# Patient Record
Sex: FEMALE | Race: WHITE | NOT HISPANIC OR LATINO
[De-identification: names, ages, dates, MRNs, and addresses within clinical notes are randomized per-mention and may not be internally consistent; named-entity substitution may affect disease eponyms.]

---

## 2017-10-27 ENCOUNTER — LABORATORY RESULT (OUTPATIENT)
Age: 52
End: 2017-10-27

## 2017-10-27 ENCOUNTER — APPOINTMENT (OUTPATIENT)
Dept: PULMONOLOGY | Facility: CLINIC | Age: 52
End: 2017-10-27
Payer: COMMERCIAL

## 2017-10-27 PROCEDURE — 99203 OFFICE O/P NEW LOW 30 MIN: CPT | Mod: 25

## 2017-11-03 ENCOUNTER — APPOINTMENT (OUTPATIENT)
Dept: PULMONOLOGY | Facility: CLINIC | Age: 52
End: 2017-11-03

## 2017-11-03 ENCOUNTER — APPOINTMENT (OUTPATIENT)
Dept: PULMONOLOGY | Facility: CLINIC | Age: 52
End: 2017-11-03
Payer: COMMERCIAL

## 2017-11-03 ENCOUNTER — LABORATORY RESULT (OUTPATIENT)
Age: 52
End: 2017-11-03

## 2017-11-03 PROCEDURE — 36415 COLL VENOUS BLD VENIPUNCTURE: CPT

## 2017-11-03 PROCEDURE — 99214 OFFICE O/P EST MOD 30 MIN: CPT | Mod: 25

## 2017-11-13 ENCOUNTER — APPOINTMENT (OUTPATIENT)
Dept: PULMONOLOGY | Facility: CLINIC | Age: 52
End: 2017-11-13

## 2018-01-31 ENCOUNTER — APPOINTMENT (OUTPATIENT)
Dept: OPHTHALMOLOGY | Facility: CLINIC | Age: 53
End: 2018-01-31
Payer: COMMERCIAL

## 2018-01-31 PROCEDURE — 92014 COMPRE OPH EXAM EST PT 1/>: CPT

## 2018-02-13 ENCOUNTER — APPOINTMENT (OUTPATIENT)
Dept: PULMONOLOGY | Facility: CLINIC | Age: 53
End: 2018-02-13
Payer: COMMERCIAL

## 2018-02-13 PROCEDURE — 94727 GAS DIL/WSHOT DETER LNG VOL: CPT

## 2018-02-13 PROCEDURE — 94060 EVALUATION OF WHEEZING: CPT

## 2018-02-13 PROCEDURE — 94729 DIFFUSING CAPACITY: CPT

## 2018-05-01 ENCOUNTER — APPOINTMENT (OUTPATIENT)
Dept: PULMONOLOGY | Facility: CLINIC | Age: 53
End: 2018-05-01
Payer: COMMERCIAL

## 2018-05-01 PROCEDURE — 99214 OFFICE O/P EST MOD 30 MIN: CPT | Mod: 25

## 2018-05-23 ENCOUNTER — APPOINTMENT (OUTPATIENT)
Dept: OTOLARYNGOLOGY | Facility: CLINIC | Age: 53
End: 2018-05-23
Payer: COMMERCIAL

## 2018-05-23 VITALS
DIASTOLIC BLOOD PRESSURE: 46 MMHG | SYSTOLIC BLOOD PRESSURE: 101 MMHG | BODY MASS INDEX: 35 KG/M2 | HEART RATE: 76 BPM | WEIGHT: 250 LBS | HEIGHT: 71 IN

## 2018-05-23 DIAGNOSIS — M81.0 AGE-RELATED OSTEOPOROSIS W/OUT CURRENT PATHOLOGICAL FRACTURE: ICD-10-CM

## 2018-05-23 DIAGNOSIS — S32.009A UNSPECIFIED FRACTURE OF UNSPECIFIED LUMBAR VERTEBRA, INITIAL ENCOUNTER FOR CLOSED FRACTURE: ICD-10-CM

## 2018-05-23 DIAGNOSIS — K75.4 AUTOIMMUNE HEPATITIS: ICD-10-CM

## 2018-05-23 DIAGNOSIS — Z86.39 PERSONAL HISTORY OF OTHER ENDOCRINE, NUTRITIONAL AND METABOLIC DISEASE: ICD-10-CM

## 2018-05-23 DIAGNOSIS — D69.6 THROMBOCYTOPENIA, UNSPECIFIED: ICD-10-CM

## 2018-05-23 DIAGNOSIS — Z87.891 PERSONAL HISTORY OF NICOTINE DEPENDENCE: ICD-10-CM

## 2018-05-23 DIAGNOSIS — D49.2 NEOPLASM OF UNSPECIFIED BEHAVIOR OF BONE, SOFT TISSUE, AND SKIN: ICD-10-CM

## 2018-05-23 DIAGNOSIS — Z86.79 PERSONAL HISTORY OF OTHER DISEASES OF THE CIRCULATORY SYSTEM: ICD-10-CM

## 2018-05-23 DIAGNOSIS — Z82.49 FAMILY HISTORY OF ISCHEMIC HEART DISEASE AND OTHER DISEASES OF THE CIRCULATORY SYSTEM: ICD-10-CM

## 2018-05-23 PROCEDURE — 99204 OFFICE O/P NEW MOD 45 MIN: CPT | Mod: 25

## 2018-05-23 PROCEDURE — 31231 NASAL ENDOSCOPY DX: CPT

## 2018-05-29 PROBLEM — S32.009A LUMBAR VERTEBRAL FRACTURE: Status: RESOLVED | Noted: 2018-05-29 | Resolved: 2018-05-29

## 2018-05-29 PROBLEM — D69.6 THROMBOCYTOPENIA: Status: ACTIVE | Noted: 2018-05-29

## 2018-05-29 PROBLEM — D49.2 LUMBAR SPINE TUMOR: Status: ACTIVE | Noted: 2018-05-29

## 2018-05-29 PROBLEM — M81.0 OSTEOPOROSIS: Status: ACTIVE | Noted: 2018-05-29

## 2018-05-29 PROBLEM — K75.4 AUTOIMMUNE HEPATITIS: Status: ACTIVE | Noted: 2018-05-29

## 2018-06-01 PROBLEM — Z82.49 FAMILY HISTORY OF HYPERTENSION: Status: ACTIVE | Noted: 2018-06-01

## 2018-06-01 RX ORDER — RIFAXIMIN 550 MG/1
550 TABLET ORAL TWICE DAILY
Refills: 0 | Status: ACTIVE | COMMUNITY

## 2018-06-01 RX ORDER — URSODIOL 300 MG/1
300 CAPSULE ORAL TWICE DAILY
Refills: 0 | Status: ACTIVE | COMMUNITY

## 2018-06-01 RX ORDER — LISINOPRIL 5 MG/1
5 TABLET ORAL
Refills: 0 | Status: ACTIVE | COMMUNITY

## 2018-06-01 RX ORDER — SODIUM SULFATE, POTASSIUM SULFATE, MAGNESIUM SULFATE 17.5; 3.13; 1.6 G/ML; G/ML; G/ML
17.5-3.13-1.6 SOLUTION, CONCENTRATE ORAL
Qty: 354 | Refills: 0 | Status: COMPLETED | COMMUNITY
Start: 2018-05-16

## 2018-06-01 RX ORDER — GABAPENTIN 300 MG/1
300 CAPSULE ORAL 3 TIMES DAILY
Refills: 0 | Status: ACTIVE | COMMUNITY

## 2018-06-01 RX ORDER — FUROSEMIDE 80 MG/1
80 TABLET ORAL TWICE DAILY
Refills: 0 | Status: ACTIVE | COMMUNITY

## 2018-06-01 RX ORDER — HYDROCODONE BITARTRATE AND IBUPROFEN 10; 200 MG/1; MG/1
10-200 TABLET ORAL
Qty: 30 | Refills: 0 | Status: COMPLETED | COMMUNITY
Start: 2017-12-06

## 2018-06-01 RX ORDER — MAGNESIUM OXIDE 200 MG
10 TABLET,CHEWABLE ORAL
Refills: 0 | Status: ACTIVE | COMMUNITY

## 2018-06-01 RX ORDER — DEXLANSOPRAZOLE 60 MG/1
60 CAPSULE, DELAYED RELEASE ORAL
Qty: 90 | Refills: 0 | Status: COMPLETED | COMMUNITY
Start: 2017-06-20

## 2018-06-01 RX ORDER — FLUTICASONE PROPIONATE 50 UG/1
50 SPRAY, METERED NASAL
Qty: 48 | Refills: 0 | Status: COMPLETED | COMMUNITY
Start: 2017-12-05

## 2018-06-01 RX ORDER — IPRATROPIUM BROMIDE 42 UG/1
0.06 SPRAY NASAL
Qty: 15 | Refills: 0 | Status: ACTIVE | COMMUNITY
Start: 2018-05-02

## 2018-06-01 RX ORDER — VALACYCLOVIR 1 G/1
1 TABLET, FILM COATED ORAL
Qty: 10 | Refills: 0 | Status: COMPLETED | COMMUNITY
Start: 2018-03-01

## 2018-06-01 RX ORDER — ALBUTEROL SULFATE 90 UG/1
108 (90 BASE) AEROSOL, METERED RESPIRATORY (INHALATION)
Qty: 54 | Refills: 0 | Status: ACTIVE | COMMUNITY
Start: 2018-02-20

## 2018-06-13 ENCOUNTER — APPOINTMENT (OUTPATIENT)
Dept: OTOLARYNGOLOGY | Facility: CLINIC | Age: 53
End: 2018-06-13
Payer: COMMERCIAL

## 2018-06-17 ENCOUNTER — FORM ENCOUNTER (OUTPATIENT)
Age: 53
End: 2018-06-17

## 2018-06-18 ENCOUNTER — OUTPATIENT (OUTPATIENT)
Dept: OUTPATIENT SERVICES | Facility: HOSPITAL | Age: 53
LOS: 1 days | End: 2018-06-18
Payer: COMMERCIAL

## 2018-06-18 ENCOUNTER — APPOINTMENT (OUTPATIENT)
Dept: CT IMAGING | Facility: HOSPITAL | Age: 53
End: 2018-06-18
Payer: COMMERCIAL

## 2018-06-18 DIAGNOSIS — Z98.89 OTHER SPECIFIED POSTPROCEDURAL STATES: Chronic | ICD-10-CM

## 2018-06-18 DIAGNOSIS — Z98.1 ARTHRODESIS STATUS: Chronic | ICD-10-CM

## 2018-06-18 DIAGNOSIS — I86.8 VARICOSE VEINS OF OTHER SPECIFIED SITES: Chronic | ICD-10-CM

## 2018-06-18 DIAGNOSIS — Z95.828 PRESENCE OF OTHER VASCULAR IMPLANTS AND GRAFTS: Chronic | ICD-10-CM

## 2018-06-18 PROCEDURE — 70486 CT MAXILLOFACIAL W/O DYE: CPT | Mod: 26

## 2018-06-18 PROCEDURE — 70486 CT MAXILLOFACIAL W/O DYE: CPT

## 2018-06-18 PROCEDURE — 71250 CT THORAX DX C-: CPT | Mod: 26

## 2018-06-18 PROCEDURE — 71250 CT THORAX DX C-: CPT

## 2018-07-11 ENCOUNTER — APPOINTMENT (OUTPATIENT)
Dept: OTOLARYNGOLOGY | Facility: CLINIC | Age: 53
End: 2018-07-11
Payer: COMMERCIAL

## 2018-07-11 VITALS
WEIGHT: 250 LBS | HEART RATE: 79 BPM | DIASTOLIC BLOOD PRESSURE: 65 MMHG | SYSTOLIC BLOOD PRESSURE: 113 MMHG | BODY MASS INDEX: 35 KG/M2 | HEIGHT: 71 IN

## 2018-07-11 DIAGNOSIS — J34.2 DEVIATED NASAL SEPTUM: ICD-10-CM

## 2018-07-11 PROCEDURE — 31231 NASAL ENDOSCOPY DX: CPT

## 2018-07-11 PROCEDURE — 99215 OFFICE O/P EST HI 40 MIN: CPT | Mod: 25

## 2018-07-11 RX ORDER — MYCOPHENOLATE MOFETIL 500 MG/1
500 TABLET ORAL
Refills: 0 | Status: ACTIVE | COMMUNITY

## 2018-07-11 RX ORDER — AMOXICILLIN AND CLAVULANATE POTASSIUM 875; 125 MG/1; MG/1
875-125 TABLET, COATED ORAL
Qty: 42 | Refills: 0 | Status: COMPLETED | COMMUNITY
Start: 2018-05-23 | End: 2018-07-11

## 2018-07-11 RX ORDER — FILGRASTIM-SNDZ 300 UG/.5ML
300 INJECTION, SOLUTION INTRAVENOUS; SUBCUTANEOUS
Qty: 6 | Refills: 0 | Status: DISCONTINUED | COMMUNITY
Start: 2018-05-03 | End: 2018-07-11

## 2018-07-11 RX ORDER — ERYTHROPOIETIN 40000 [IU]/ML
40000 INJECTION, SOLUTION INTRAVENOUS; SUBCUTANEOUS
Qty: 4 | Refills: 0 | Status: DISCONTINUED | COMMUNITY
Start: 2018-04-25 | End: 2018-07-11

## 2018-07-11 RX ORDER — SPIRONOLACTONE 100 MG/1
100 TABLET ORAL
Refills: 0 | Status: ACTIVE | COMMUNITY

## 2018-07-11 RX ORDER — DEXLANSOPRAZOLE 60 MG/1
60 CAPSULE, DELAYED RELEASE ORAL
Refills: 0 | Status: ACTIVE | COMMUNITY

## 2018-07-12 PROBLEM — J34.2 DEVIATED NASAL SEPTUM: Status: ACTIVE | Noted: 2018-06-01

## 2018-07-13 ENCOUNTER — RX RENEWAL (OUTPATIENT)
Age: 53
End: 2018-07-13

## 2018-09-10 ENCOUNTER — APPOINTMENT (OUTPATIENT)
Dept: OTOLARYNGOLOGY | Facility: HOSPITAL | Age: 53
End: 2018-09-10

## 2018-09-24 ENCOUNTER — RESULT REVIEW (OUTPATIENT)
Age: 53
End: 2018-09-24

## 2018-09-24 RX ORDER — FUROSEMIDE 40 MG
1 TABLET ORAL
Qty: 0 | Refills: 0 | COMMUNITY

## 2018-09-24 RX ORDER — DEXLANSOPRAZOLE 30 MG/1
1 CAPSULE, DELAYED RELEASE ORAL
Qty: 0 | Refills: 0 | COMMUNITY

## 2018-09-24 RX ORDER — URSODIOL 250 MG/1
3 TABLET, FILM COATED ORAL
Qty: 0 | Refills: 0 | COMMUNITY

## 2018-09-24 RX ORDER — METOPROLOL TARTRATE 50 MG
1 TABLET ORAL
Qty: 0 | Refills: 0 | COMMUNITY

## 2018-09-24 RX ORDER — SPIRONOLACTONE 25 MG/1
200 TABLET, FILM COATED ORAL
Qty: 0 | Refills: 0 | COMMUNITY

## 2018-09-24 RX ORDER — LEVOTHYROXINE SODIUM 125 MCG
1 TABLET ORAL
Qty: 0 | Refills: 0 | COMMUNITY

## 2018-09-24 NOTE — ASU PATIENT PROFILE, ADULT - PMH
Allergic rhinitis    Cirrhosis    DM (diabetes mellitus)    H/O ascites    Hepatitis  autoimmune  HTN (hypertension)    Hypothyroidism    Retroperitoneal fibrosis    Small bowel obstruction  2015, 2016

## 2018-09-24 NOTE — ASU PATIENT PROFILE, ADULT - PSH
H/O ovarian cystectomy  left  H/O spinal fusion  cervical  History of D&C    S/P TIPS (transjugular intrahepatic portosystemic shunt)    Varicose veins

## 2018-09-25 ENCOUNTER — APPOINTMENT (OUTPATIENT)
Dept: OTOLARYNGOLOGY | Facility: HOSPITAL | Age: 53
End: 2018-09-25

## 2018-09-25 ENCOUNTER — OUTPATIENT (OUTPATIENT)
Dept: OUTPATIENT SERVICES | Facility: HOSPITAL | Age: 53
LOS: 1 days | Discharge: HOME | End: 2018-09-25
Payer: COMMERCIAL

## 2018-09-25 VITALS
RESPIRATION RATE: 20 BRPM | OXYGEN SATURATION: 98 % | WEIGHT: 245.15 LBS | HEART RATE: 67 BPM | TEMPERATURE: 98 F | SYSTOLIC BLOOD PRESSURE: 116 MMHG | HEIGHT: 71 IN | DIASTOLIC BLOOD PRESSURE: 55 MMHG

## 2018-09-25 VITALS
OXYGEN SATURATION: 98 % | HEART RATE: 78 BPM | RESPIRATION RATE: 16 BRPM | DIASTOLIC BLOOD PRESSURE: 57 MMHG | TEMPERATURE: 98 F | SYSTOLIC BLOOD PRESSURE: 109 MMHG

## 2018-09-25 DIAGNOSIS — Z98.1 ARTHRODESIS STATUS: Chronic | ICD-10-CM

## 2018-09-25 DIAGNOSIS — Z95.828 PRESENCE OF OTHER VASCULAR IMPLANTS AND GRAFTS: Chronic | ICD-10-CM

## 2018-09-25 DIAGNOSIS — Z98.89 OTHER SPECIFIED POSTPROCEDURAL STATES: Chronic | ICD-10-CM

## 2018-09-25 DIAGNOSIS — I86.8 VARICOSE VEINS OF OTHER SPECIFIED SITES: Chronic | ICD-10-CM

## 2018-09-25 LAB — GLUCOSE BLDC GLUCOMTR-MCNC: 191 MG/DL — HIGH (ref 70–99)

## 2018-09-25 PROCEDURE — 61782 SCAN PROC CRANIAL EXTRA: CPT

## 2018-09-25 PROCEDURE — 31255 NSL/SINS NDSC W/TOT ETHMDCT: CPT | Mod: 50

## 2018-09-25 PROCEDURE — 30140 RESECT INFERIOR TURBINATE: CPT | Mod: 50

## 2018-09-25 PROCEDURE — C2625: CPT

## 2018-09-25 PROCEDURE — 88304 TISSUE EXAM BY PATHOLOGIST: CPT

## 2018-09-25 PROCEDURE — 88311 DECALCIFY TISSUE: CPT

## 2018-09-25 PROCEDURE — 30802 ABLATE INF TURBINATE SUBMUC: CPT

## 2018-09-25 PROCEDURE — 31267 ENDOSCOPY MAXILLARY SINUS: CPT | Mod: 50

## 2018-09-25 PROCEDURE — 88305 TISSUE EXAM BY PATHOLOGIST: CPT

## 2018-09-25 PROCEDURE — 82962 GLUCOSE BLOOD TEST: CPT

## 2018-09-25 RX ORDER — ACETAMINOPHEN 500 MG
650 TABLET ORAL EVERY 6 HOURS
Qty: 0 | Refills: 0 | Status: DISCONTINUED | OUTPATIENT
Start: 2018-09-25 | End: 2018-09-25

## 2018-09-25 RX ORDER — ONDANSETRON 8 MG/1
4 TABLET, FILM COATED ORAL EVERY 6 HOURS
Qty: 0 | Refills: 0 | Status: DISCONTINUED | OUTPATIENT
Start: 2018-09-25 | End: 2018-09-25

## 2018-09-25 RX ORDER — OXYCODONE AND ACETAMINOPHEN 5; 325 MG/1; MG/1
1 TABLET ORAL EVERY 4 HOURS
Qty: 0 | Refills: 0 | Status: DISCONTINUED | OUTPATIENT
Start: 2018-09-25 | End: 2018-09-25

## 2018-09-25 RX ORDER — MORPHINE SULFATE 50 MG/1
4 CAPSULE, EXTENDED RELEASE ORAL
Qty: 0 | Refills: 0 | Status: DISCONTINUED | OUTPATIENT
Start: 2018-09-25 | End: 2018-09-25

## 2018-09-25 RX ORDER — SODIUM CHLORIDE 0.65 %
2 AEROSOL, SPRAY (ML) NASAL
Qty: 100 | Refills: 0 | OUTPATIENT
Start: 2018-09-25

## 2018-09-25 RX ORDER — SODIUM CHLORIDE 9 MG/ML
1000 INJECTION, SOLUTION INTRAVENOUS
Qty: 0 | Refills: 0 | Status: DISCONTINUED | OUTPATIENT
Start: 2018-09-25 | End: 2018-09-25

## 2018-09-25 RX ADMIN — OXYCODONE AND ACETAMINOPHEN 1 TABLET(S): 5; 325 TABLET ORAL at 21:30

## 2018-09-25 RX ADMIN — OXYCODONE AND ACETAMINOPHEN 1 TABLET(S): 5; 325 TABLET ORAL at 21:52

## 2018-09-25 NOTE — BRIEF OPERATIVE NOTE - PRE-OP DX
Sinusitis  09/25/2018    Active  Jose Zamora Chronic ethmoidal sinusitis  10/03/2018    Active  Thelma Burciaga  Chronic maxillary sinusitis  10/03/2018    Active  Thelma Burciaga  Hypertrophy of both inferior nasal turbinates  10/03/2018    Thelma Solorio

## 2018-09-25 NOTE — BRIEF OPERATIVE NOTE - PROCEDURE
<<-----Click on this checkbox to enter Procedure Sinus surgery  09/25/2018    Active  [id number] Ablation of submucosal soft tissue of inferior turbinates  10/03/2018    Active  [email or web address]  Endoscopic maxillary antrostomy with tissue removal  10/03/2018  Bilateral  Active  [email or web address]  Ethmoidectomy, endoscopic, using computer-assisted navigation  10/03/2018  Bilateral total  Active  [email or web address]

## 2018-09-25 NOTE — BRIEF OPERATIVE NOTE - OPERATION/FINDINGS
bilat max, total ethmoidectomy, inferior turbinoplasty, partial middle turbinectomy under image guidance Mucosal thickening in ethmoid cells bilateral.  Small maxillary ostia.  Small defect in right anterior lamina, no bleeding.  Bilateral partial middle turbinate resection.  Quixby image guidance.  Enlarged bilateral inferior turbinates.  Surgiflo in both ethmoid cavities.  Propel stent in left middle meatus

## 2018-09-25 NOTE — BRIEF OPERATIVE NOTE - POST-OP DX
Sinusitis nasal  09/25/2018    Active  Jose Zamora Chronic ethmoidal sinusitis  10/03/2018    Active  Thelma Burciaga  Chronic maxillary sinusitis  10/03/2018    Active  Thelma Burciaga  Hypertrophy of both inferior nasal turbinates  10/03/2018    Thelma Solorio

## 2018-09-26 ENCOUNTER — APPOINTMENT (OUTPATIENT)
Dept: PULMONOLOGY | Facility: CLINIC | Age: 53
End: 2018-09-26

## 2018-10-03 ENCOUNTER — APPOINTMENT (OUTPATIENT)
Dept: OTOLARYNGOLOGY | Facility: CLINIC | Age: 53
End: 2018-10-03
Payer: COMMERCIAL

## 2018-10-03 VITALS
WEIGHT: 249 LBS | HEART RATE: 78 BPM | DIASTOLIC BLOOD PRESSURE: 48 MMHG | HEIGHT: 71 IN | BODY MASS INDEX: 34.86 KG/M2 | SYSTOLIC BLOOD PRESSURE: 133 MMHG

## 2018-10-03 PROCEDURE — 99213 OFFICE O/P EST LOW 20 MIN: CPT | Mod: 25

## 2018-10-03 PROCEDURE — 31237 NSL/SINS NDSC SURG BX POLYPC: CPT | Mod: 50,58

## 2018-10-03 RX ORDER — METFORMIN HYDROCHLORIDE 500 MG/1
500 TABLET, COATED ORAL
Refills: 0 | Status: COMPLETED | COMMUNITY
End: 2018-10-03

## 2018-10-03 RX ORDER — SAXAGLIPTIN 5 MG/1
5 TABLET, FILM COATED ORAL
Refills: 0 | Status: COMPLETED | COMMUNITY
End: 2018-10-03

## 2018-10-03 RX ORDER — LIOTHYRONINE SODIUM 5 UG/1
5 TABLET ORAL
Refills: 0 | Status: COMPLETED | COMMUNITY
End: 2018-10-03

## 2018-10-03 RX ORDER — IPRATROPIUM BROMIDE 42 UG/1
0.06 SPRAY NASAL 3 TIMES DAILY
Qty: 1 | Refills: 0 | Status: COMPLETED | COMMUNITY
Start: 2018-07-13 | End: 2018-10-03

## 2018-10-05 ENCOUNTER — APPOINTMENT (OUTPATIENT)
Dept: OTOLARYNGOLOGY | Facility: CLINIC | Age: 53
End: 2018-10-05
Payer: COMMERCIAL

## 2018-10-05 VITALS
WEIGHT: 248 LBS | SYSTOLIC BLOOD PRESSURE: 117 MMHG | BODY MASS INDEX: 34.72 KG/M2 | DIASTOLIC BLOOD PRESSURE: 65 MMHG | HEIGHT: 71 IN | HEART RATE: 87 BPM

## 2018-10-05 DIAGNOSIS — H60.60 UNSPECIFIED CHRONIC OTITIS EXTERNA, UNSPECIFIED EAR: ICD-10-CM

## 2018-10-05 DIAGNOSIS — J34.3 HYPERTROPHY OF NASAL TURBINATES: ICD-10-CM

## 2018-10-05 LAB — SURGICAL PATHOLOGY STUDY: SIGNIFICANT CHANGE UP

## 2018-10-05 PROCEDURE — 31237 NSL/SINS NDSC SURG BX POLYPC: CPT | Mod: 50,58

## 2018-10-05 PROCEDURE — 99024 POSTOP FOLLOW-UP VISIT: CPT

## 2018-10-05 RX ORDER — SODIUM CHLORIDE 0.65 %
0.65 AEROSOL, SPRAY (ML) NASAL
Refills: 0 | Status: ACTIVE | COMMUNITY

## 2018-10-05 RX ORDER — METOPROLOL TARTRATE 50 MG/1
50 TABLET, FILM COATED ORAL
Refills: 0 | Status: ACTIVE | COMMUNITY

## 2018-10-05 NOTE — CONSULT LETTER
[Dear  ___] : Dear  [unfilled], [Courtesy Letter:] : I had the pleasure of seeing your patient, [unfilled], in my office today. [Consult Closing:] : Thank you very much for allowing me to participate in the care of this patient.  If you have any questions, please do not hesitate to contact me. [Sincerely,] : Sincerely, [DrLyndsay  ___] : Dr. DUNCAN [DrLyndsay ___] : Dr. DUNCAN [FreeTextEntry2] : Carrie Miner M.D.\par Pulmonary Division\par [facility]\[place] [address]\par  [FreeTextEntry1] : \par Enclosed please find my office notes for October 3, 2018. \par \par \par \par  [FreeTextEntry3] : \par Thelma Burciaga MD \par Otolaryngology, Head and Neck Surgery \par Residency site , [facility]\par

## 2018-10-05 NOTE — PHYSICAL EXAM
[Nasal Endoscopy Performed] : nasal endoscopy was performed, see procedure section for findings [Normal] : extraocular movements are normal [de-identified] : Mild ecchymosis right infraorbital area, no swelling.

## 2018-10-05 NOTE — HISTORY OF PRESENT ILLNESS
[de-identified] : Ms. Bowen underwent bilateral endoscopic sinus surgery (maxillary, total ethmoid) and bilateral inferior turbinate reduction for chronic sinusitis and inferior turbinate hypertrophy on September 25, 2018, at [facility].\par \par Operative findings:\par - very small, narrow OMU bilateral with strange orientation of ostia\par - mucosal thickening in ethmoid sinuses bilateral\par - small lamina defect right anterior ethmoid near uncinate but no bleeding.\par - Both middle turbinates were partially resected.\par - Surgiflo in both ethmoids\par - Propel stent in left middle meatus\par - bilateral inferior turbinate reduction submucosal ablation \par \par \par She is having temporal and frontal pressure headaches that started today, so took Oxycodone at 3:00pm.\par Is using saline spray a few times per day, is pulling out strings of mucus. \par Has nasal congestion R>L.

## 2018-10-05 NOTE — PROCEDURE
[FreeTextEntry6] : with DEBRIDEMENT\par Indication: sinus surgery\par -Verbal consent was obtained from patient prior to exam. \par - Juan David-Synephrine and lidocaine 2% spray applied to nose bilaterally.\par Nasal endoscopy was performed with 0-degree  rigid  scope.\par Findings: \par -- Inferior turbinates healing\par -- Septum was intact\par -- No polyps either side nose\par -- Middle turbinates partially resected, healing\par -- Crusting and dried blood in right middle meatus, cannot remove because too hard.\par -- Crusting and dried blood in left nasal cavity removed.  Left middle meatus obstructed with crusting and dried blood, partially removed but most still too hard.  Only small pieces of Propel were removed.\par - Left maxillary sinus antrostomy partially visible\par -- Ethmoid sinus cavity not visible due to crusting.\par \par The patient tolerated the procedure well.\par

## 2018-10-08 PROBLEM — J34.3 HYPERTROPHY OF BOTH INFERIOR NASAL TURBINATES: Status: RESOLVED | Noted: 2018-06-01 | Resolved: 2018-10-08

## 2018-10-08 PROBLEM — H60.60 CHRONIC OTITIS EXTERNA: Status: RESOLVED | Noted: 2018-05-23 | Resolved: 2018-10-08

## 2018-10-08 NOTE — CONSULT LETTER
[Dear  ___] : Dear  [unfilled], [Courtesy Letter:] : I had the pleasure of seeing your patient, [unfilled], in my office today. [Consult Closing:] : Thank you very much for allowing me to participate in the care of this patient.  If you have any questions, please do not hesitate to contact me. [Sincerely,] : Sincerely, [DrLyndsay  ___] : Dr. DUNCAN [DrLyndsay ___] : Dr. DUNCAN [FreeTextEntry2] : Carrie Miner M.D.\par Pulmonary Division\par [facility]\[place] [address]\par  [FreeTextEntry1] : \par Enclosed please find my office notes for October 5, 2018. \par \par \par \par  [FreeTextEntry3] : \par Thelma Burciaga MD \par Otolaryngology, Head and Neck Surgery \par Residency site , [facility]\par

## 2018-10-08 NOTE — HISTORY OF PRESENT ILLNESS
[de-identified] : Ms. Bowen was seen in office for additional debridement.\par s/p bilateral endoscopic sinus surgery (maxillary, total ethmoid) and bilateral inferior turbinate reduction for chronic sinusitis and inferior turbinate hypertrophy on September 25, 2018, at [facility].\par \par She is still having facial pressure and nasal congestion.\par Is using saline spray a few times per day.  Brown chunks of mucus came out this morning.\par

## 2018-10-08 NOTE — ASSESSMENT
[FreeTextEntry1] : Ms. VALADEZ is recovering following bilateral endoscopic sinus surgery (maxillary, total ethmoid) for chronic sinusitis.\par There is resolving ecchymosis from small lamina defect on right side.\par The sinus cavities were debrided satisfactorily today.\par \par Plan:\par -- Continue saline nasal spray and irrigations in nose\par \par Return in 3 weeks\par \par

## 2018-10-08 NOTE — PROCEDURE
[FreeTextEntry6] : with DEBRIDEMENT\par Indication: sinus surgery\par -Verbal consent was obtained from patient prior to exam. \par - Juan David-Synephrine and lidocaine 2% spray applied to nose bilaterally.\par Nasal endoscopy was performed with 0-degree  rigid  scope.\par Findings: \par -- Inferior turbinates healing\par -- Septum was intact\par -- Middle turbinates partially resected, with eschar\par -- Crusting and dried blood in right middle meatus was removed with forceps.  \par -- Crusting and dried blood in left middle meatus, in addition to remainder Propel stent, were removed with forceps and suction.\par - Left maxillary sinus antrostomy and ethmoid cavity are patent\par - Right maxillary antrostomy and ethmoid sinus cavity are patent\par \par The patient tolerated the procedure well.\par

## 2018-10-08 NOTE — PHYSICAL EXAM
[Nasal Endoscopy Performed] : nasal endoscopy was performed, see procedure section for findings [Normal] : no neck adenopathy [de-identified] : 1+ bilateral  [de-identified] : resolving ecchymosis right infraorbital area.

## 2018-10-26 ENCOUNTER — APPOINTMENT (OUTPATIENT)
Dept: OTOLARYNGOLOGY | Facility: CLINIC | Age: 53
End: 2018-10-26
Payer: COMMERCIAL

## 2018-10-26 VITALS
HEART RATE: 71 BPM | DIASTOLIC BLOOD PRESSURE: 55 MMHG | SYSTOLIC BLOOD PRESSURE: 111 MMHG | WEIGHT: 249 LBS | HEIGHT: 71 IN | BODY MASS INDEX: 34.86 KG/M2

## 2018-10-26 PROCEDURE — 99213 OFFICE O/P EST LOW 20 MIN: CPT

## 2018-12-04 ENCOUNTER — APPOINTMENT (OUTPATIENT)
Dept: PULMONOLOGY | Facility: CLINIC | Age: 53
End: 2018-12-04
Payer: COMMERCIAL

## 2018-12-04 VITALS
TEMPERATURE: 98.9 F | SYSTOLIC BLOOD PRESSURE: 120 MMHG | BODY MASS INDEX: 35.14 KG/M2 | DIASTOLIC BLOOD PRESSURE: 60 MMHG | HEIGHT: 71 IN | OXYGEN SATURATION: 97 % | HEART RATE: 69 BPM | WEIGHT: 251 LBS

## 2018-12-04 DIAGNOSIS — R76.11 NONSPECIFIC REACTION TO TUBERCULIN SKIN TEST W/OUT ACTIVE TUBERCULOSIS: ICD-10-CM

## 2018-12-04 PROCEDURE — 99215 OFFICE O/P EST HI 40 MIN: CPT

## 2019-01-08 RX ORDER — BUDESONIDE AND FORMOTEROL FUMARATE DIHYDRATE 160; 4.5 UG/1; UG/1
160-4.5 AEROSOL RESPIRATORY (INHALATION) TWICE DAILY
Qty: 3 | Refills: 3 | Status: ACTIVE | COMMUNITY
Start: 2018-02-20 | End: 1900-01-01

## 2019-01-23 ENCOUNTER — APPOINTMENT (OUTPATIENT)
Dept: OTOLARYNGOLOGY | Facility: CLINIC | Age: 54
End: 2019-01-23
Payer: COMMERCIAL

## 2019-01-23 VITALS
DIASTOLIC BLOOD PRESSURE: 72 MMHG | HEART RATE: 84 BPM | BODY MASS INDEX: 37.52 KG/M2 | HEIGHT: 71 IN | SYSTOLIC BLOOD PRESSURE: 129 MMHG | WEIGHT: 268 LBS

## 2019-01-23 DIAGNOSIS — J34.89 OTHER SPECIFIED DISORDERS OF NOSE AND NASAL SINUSES: ICD-10-CM

## 2019-01-23 PROCEDURE — 99213 OFFICE O/P EST LOW 20 MIN: CPT | Mod: 25

## 2019-01-23 PROCEDURE — 31231 NASAL ENDOSCOPY DX: CPT

## 2019-01-27 PROBLEM — J34.89 NASAL VALVE COLLAPSE: Status: RESOLVED | Noted: 2018-05-23 | Resolved: 2019-01-27

## 2019-01-27 RX ORDER — MV-MIN/FOLIC/VIT K/LYCOP/COQ10 200-100MCG
CAPSULE ORAL
Refills: 0 | Status: ACTIVE | COMMUNITY

## 2019-01-27 RX ORDER — IRON/IRON ASP GLY/FA/MV-MIN 38 125-25-1MG
TABLET ORAL
Refills: 0 | Status: ACTIVE | COMMUNITY

## 2019-01-27 NOTE — HISTORY OF PRESENT ILLNESS
[de-identified] : Ms. VALADEZ  is a 53 year old F being seen today in the office for f/u. \par \par s/p bilateral endoscopic sinus surgery (maxillary, total ethmoid) and bilateral inferior turbinate reduction for chronic sinusitis and inferior turbinate hypertrophy on September 25, 2018, at [facility].  There was a small defect in right lamina papyracea at time of surgery.\par \par Today she is having a little nasal congestion. \par She is no longer having facial pressure or headaches.  Cough is much much less.\par Does nasal irrigation every few days now.\par She noted a small amount of blood from her nose when she cleaned it this AM. \par Overall, breathing is much better since surgery.\par \par Still has bilateral itching in her ears \par She has tried using mineral oil to the ears but does not feel like the oil gets into her ear. \par Overall her body skin is very dry.

## 2019-01-27 NOTE — ASSESSMENT
[FreeTextEntry1] : Ms. VALADEZ has healed well following bilateral endoscopic sinus surgery (maxillary, total ethmoid) for chronic sinusitis. No sx of sinusitis now.  Cough is much better.\par Has dry skin all over her body, including ear canals.\par \par Plan:\par -- Continue saline nasal spray and irrigations in nose\par -- reinstructed method for mineral oil instillation in ears\par -- Neutrogena Rainbath or sesame oil for skin.  Can also try Basis soap\par \par Return in 6 months\par \par

## 2019-01-27 NOTE — CONSULT LETTER
[Dear  ___] : Dear  [unfilled], [Courtesy Letter:] : I had the pleasure of seeing your patient, [unfilled], in my office today. [Consult Closing:] : Thank you very much for allowing me to participate in the care of this patient.  If you have any questions, please do not hesitate to contact me. [Sincerely,] : Sincerely, [DrLyndsay  ___] : Dr. DUNCAN [DrLyndsay ___] : Dr. DUNCAN [FreeTextEntry2] : Carrie Miner M.D.\par Pulmonary Division\par [facility]\[place] [address]\par  [FreeTextEntry1] : \par \par Enclosed please find my office notes for January 23, 2019. \par \par \par \par \par \par \par  [FreeTextEntry3] : \par Thelma Burciaga MD \par Otolaryngology, Head and Neck Surgery \par Residency site , [facility]\par

## 2019-01-27 NOTE — PHYSICAL EXAM
[Nasal Endoscopy Performed] : nasal endoscopy was performed, see procedure section for findings [Midline] : trachea located in midline position [Normal] : no neck adenopathy [de-identified] : EAC skin very dry .jailyn   Bacitracin ointment applied to EAC skin. [de-identified] : 1+ bilateral

## 2019-01-27 NOTE — PROCEDURE
[FreeTextEntry6] : \par Indication: chronic sinusitis\par -Verbal consent was obtained from patient prior to exam. \par - Juan David-Synephrine spray applied to nose bilaterally.\par Nasal endoscopy was performed with flexible  scope.\par Findings: \par -- Inferior turbinates normal\par -- Septum was intact\par -- Middle turbinates were partially resected.  Crusting small on right middle turbinate remnant  \par -- Left maxillary sinus antrostomy and ethmoid cavity are patent, no d/c\par -- Right maxillary antrostomy and ethmoid sinus cavity are patent, no d/c\par \par The patient tolerated the procedure well.\par

## 2019-04-07 ENCOUNTER — FORM ENCOUNTER (OUTPATIENT)
Age: 54
End: 2019-04-07

## 2019-04-08 ENCOUNTER — OUTPATIENT (OUTPATIENT)
Dept: OUTPATIENT SERVICES | Facility: HOSPITAL | Age: 54
LOS: 1 days | End: 2019-04-08
Payer: COMMERCIAL

## 2019-04-08 ENCOUNTER — APPOINTMENT (OUTPATIENT)
Dept: CT IMAGING | Facility: HOSPITAL | Age: 54
End: 2019-04-08
Payer: COMMERCIAL

## 2019-04-08 DIAGNOSIS — Z98.89 OTHER SPECIFIED POSTPROCEDURAL STATES: Chronic | ICD-10-CM

## 2019-04-08 DIAGNOSIS — I86.8 VARICOSE VEINS OF OTHER SPECIFIED SITES: Chronic | ICD-10-CM

## 2019-04-08 DIAGNOSIS — Z95.828 PRESENCE OF OTHER VASCULAR IMPLANTS AND GRAFTS: Chronic | ICD-10-CM

## 2019-04-08 DIAGNOSIS — Z98.1 ARTHRODESIS STATUS: Chronic | ICD-10-CM

## 2019-04-08 PROCEDURE — 71250 CT THORAX DX C-: CPT

## 2019-04-08 PROCEDURE — 71250 CT THORAX DX C-: CPT | Mod: 26

## 2019-04-10 ENCOUNTER — APPOINTMENT (OUTPATIENT)
Dept: OPHTHALMOLOGY | Facility: CLINIC | Age: 54
End: 2019-04-10
Payer: COMMERCIAL

## 2019-04-10 DIAGNOSIS — H26.9 UNSPECIFIED CATARACT: ICD-10-CM

## 2019-04-10 PROCEDURE — 92014 COMPRE OPH EXAM EST PT 1/>: CPT

## 2019-07-17 ENCOUNTER — APPOINTMENT (OUTPATIENT)
Dept: OTOLARYNGOLOGY | Facility: CLINIC | Age: 54
End: 2019-07-17
Payer: COMMERCIAL

## 2019-07-17 VITALS
DIASTOLIC BLOOD PRESSURE: 59 MMHG | HEART RATE: 67 BPM | SYSTOLIC BLOOD PRESSURE: 96 MMHG | BODY MASS INDEX: 40.6 KG/M2 | WEIGHT: 290 LBS | HEIGHT: 71 IN

## 2019-07-17 DIAGNOSIS — Z87.09 PERSONAL HISTORY OF OTHER DISEASES OF THE RESPIRATORY SYSTEM: ICD-10-CM

## 2019-07-17 PROCEDURE — 99214 OFFICE O/P EST MOD 30 MIN: CPT | Mod: 25

## 2019-07-17 PROCEDURE — 31575 DIAGNOSTIC LARYNGOSCOPY: CPT

## 2019-07-25 ENCOUNTER — APPOINTMENT (OUTPATIENT)
Dept: OTOLARYNGOLOGY | Facility: CLINIC | Age: 54
End: 2019-07-25
Payer: COMMERCIAL

## 2019-07-25 PROCEDURE — 92524 BEHAVRAL QUALIT ANALYS VOICE: CPT | Mod: GN

## 2019-07-25 PROCEDURE — 31579 LARYNGOSCOPY TELESCOPIC: CPT

## 2019-07-31 PROBLEM — Z87.09 HISTORY OF CHRONIC COUGH: Status: RESOLVED | Noted: 2018-05-23 | Resolved: 2019-07-31

## 2019-07-31 NOTE — ASSESSMENT
[FreeTextEntry1] : Ms. Bowen was evaluated for the following:\par \par 1.) hoarseness - TVF thickening at impact points bilateral and mucus/edema of both TVFs\par 2.) reflux is not really controlled on Dexilant\par \par PLAN:\par - Voice therapy\par - continue reflux precautions and Dexilant\par - Will try to speak to Dr. Grimes re adding another medication, such as H2 blocker.  She has appt with Dr. Kulkarni (liver specialist) on Aug 21.\par \par Return in 2 months\par

## 2019-07-31 NOTE — PROCEDURE
[Image(s) Captured] : image(s) captured and filed [Video Captured] : video captured and filed [de-identified] : \par Indication:  reflux\par -Verbal consent was obtained from patient prior to procedure.\par -Juan David-Synephrine spray applied to the nasal cavities.\par Flexible laryngoscopy was performed via left nostril and revealed the following:\par   -- Nasopharynx had no mass or exudate.\par   -- Base of tongue was symmetric and not enlarged.\par   -- Vallecula was clear\par   -- Epiglottis, both aryepiglottic folds and both false vocal folds were normal\par   -- Arytenoids both with moderate edema and erythema \par   -- True vocal folds were fully mobile and  have thickening at the maximal impact points; both TVFs edematous. Thick white mucus strands roll over the vocal cords during phonation.\par   -- Post cricoid area was edematous.  Copious clear mucous and posterior hypopharynx.\par   -- Interarytenoid edema was  present.\par \par The patient tolerated the procedure well.\par

## 2019-07-31 NOTE — CONSULT LETTER
[Dear  ___] : Dear  [unfilled], [Courtesy Letter:] : I had the pleasure of seeing your patient, [unfilled], in my office today. [Sincerely,] : Sincerely, [Consult Closing:] : Thank you very much for allowing me to participate in the care of this patient.  If you have any questions, please do not hesitate to contact me. [DrLyndsay ___] : Dr. DUNCAN [DrLyndsay  ___] : Dr. DUNCAN [FreeTextEntry2] : Carrie Miner M.D.\par Pulmonary Division\par [facility]\[place] [address]\par  [FreeTextEntry3] : \par Thelma Buricaga MD \par Otolaryngology, Head and Neck Surgery \par Residency site , [facility]\par  [FreeTextEntry1] : \par \par Enclosed please find my office notes for July 17, 2019. \par \par \par \par \par \par

## 2019-07-31 NOTE — HISTORY OF PRESENT ILLNESS
[de-identified] : Ms. Bowen c/o hoarseness x 2 months, following a bad URI.  Voice is getting a little better over time.\par In the morning, there is more hoarseness, and her voice sometimes "does not come out".\par Reflux is bad, on Dexilant.\par \par She has rare cough and only occasional runny nose; she is happy after her sinus surgery.\par s/p bilateral endoscopic sinus surgery (maxillary, total ethmoid) and bilateral inferior turbinate reduction for chronic sinusitis and inferior turbinate hypertrophy on September 25, 2018, at [facility]. Small defect in right lamina papyracea at time of surgery.\par \par Her medical records were reviewed at [facility], and she reports that no additional tests/treatments were recommended at this time.\par

## 2019-07-31 NOTE — PHYSICAL EXAM
[Laryngoscopy Performed] : laryngoscopy was performed, see procedure section for findings [Normal] : no neck adenopathy [FreeTextEntry1] : Voice sounds congested, better after she clears throat. Occasional voice breaks. No dyspnea. [de-identified] : Maxillary and ethmoid cavities are patent. [de-identified] : 1+ bilateral  [de-identified] : copious frothy clear secretions in the oropharynx.

## 2019-08-01 ENCOUNTER — APPOINTMENT (OUTPATIENT)
Dept: OTOLARYNGOLOGY | Facility: CLINIC | Age: 54
End: 2019-08-01
Payer: COMMERCIAL

## 2019-08-01 PROCEDURE — 92507 TX SP LANG VOICE COMM INDIV: CPT | Mod: GN

## 2019-08-08 ENCOUNTER — APPOINTMENT (OUTPATIENT)
Dept: OTOLARYNGOLOGY | Facility: CLINIC | Age: 54
End: 2019-08-08
Payer: COMMERCIAL

## 2019-08-08 PROCEDURE — 92507 TX SP LANG VOICE COMM INDIV: CPT

## 2019-08-15 ENCOUNTER — APPOINTMENT (OUTPATIENT)
Dept: OTOLARYNGOLOGY | Facility: CLINIC | Age: 54
End: 2019-08-15
Payer: COMMERCIAL

## 2019-08-15 PROCEDURE — 92507 TX SP LANG VOICE COMM INDIV: CPT | Mod: GN

## 2019-08-22 ENCOUNTER — APPOINTMENT (OUTPATIENT)
Dept: OTOLARYNGOLOGY | Facility: CLINIC | Age: 54
End: 2019-08-22
Payer: COMMERCIAL

## 2019-08-22 PROCEDURE — 92507 TX SP LANG VOICE COMM INDIV: CPT | Mod: GN

## 2019-08-29 ENCOUNTER — APPOINTMENT (OUTPATIENT)
Dept: OTOLARYNGOLOGY | Facility: CLINIC | Age: 54
End: 2019-08-29
Payer: COMMERCIAL

## 2019-08-29 PROCEDURE — 92507 TX SP LANG VOICE COMM INDIV: CPT | Mod: GN

## 2019-09-12 ENCOUNTER — APPOINTMENT (OUTPATIENT)
Dept: OTOLARYNGOLOGY | Facility: CLINIC | Age: 54
End: 2019-09-12
Payer: COMMERCIAL

## 2019-09-12 PROCEDURE — 92507 TX SP LANG VOICE COMM INDIV: CPT | Mod: GN

## 2019-09-18 ENCOUNTER — APPOINTMENT (OUTPATIENT)
Dept: OTOLARYNGOLOGY | Facility: CLINIC | Age: 54
End: 2019-09-18
Payer: COMMERCIAL

## 2019-09-18 VITALS
HEART RATE: 75 BPM | HEIGHT: 71 IN | SYSTOLIC BLOOD PRESSURE: 95 MMHG | WEIGHT: 290 LBS | DIASTOLIC BLOOD PRESSURE: 64 MMHG | BODY MASS INDEX: 40.6 KG/M2

## 2019-09-18 PROCEDURE — 99213 OFFICE O/P EST LOW 20 MIN: CPT

## 2019-09-18 PROCEDURE — ZZZZZ: CPT

## 2019-10-17 ENCOUNTER — APPOINTMENT (OUTPATIENT)
Dept: OTOLARYNGOLOGY | Facility: CLINIC | Age: 54
End: 2019-10-17
Payer: COMMERCIAL

## 2019-10-17 PROCEDURE — 92507 TX SP LANG VOICE COMM INDIV: CPT | Mod: GN

## 2019-10-29 ENCOUNTER — APPOINTMENT (OUTPATIENT)
Dept: PULMONOLOGY | Facility: CLINIC | Age: 54
End: 2019-10-29
Payer: MEDICARE

## 2019-10-29 VITALS
HEART RATE: 73 BPM | TEMPERATURE: 98.1 F | WEIGHT: 293 LBS | BODY MASS INDEX: 41.02 KG/M2 | SYSTOLIC BLOOD PRESSURE: 118 MMHG | HEIGHT: 71 IN | OXYGEN SATURATION: 98 % | DIASTOLIC BLOOD PRESSURE: 72 MMHG

## 2019-10-29 DIAGNOSIS — R53.82 CHRONIC FATIGUE, UNSPECIFIED: ICD-10-CM

## 2019-10-29 PROCEDURE — 99214 OFFICE O/P EST MOD 30 MIN: CPT

## 2019-10-29 NOTE — HISTORY OF PRESENT ILLNESS
[Difficulty Breathing During Exertion] : stable dyspnea on exertion [Feelings Of Weakness On Exertion] : stable exercise intolerance [Cough] : resolved coughing [Wheezing] : stable wheezing [Regional Soft Tissue Swelling Both Lower Extremities] : denies lower extremity edema [Chest Pain Or Discomfort] : denies chest pain [Fever] : denies fever [Wt Gain ___ Lbs] : recent [unfilled] ~Upound(s) weight gain [1  - Very slight] : 1, very slight [Class II - Mild Symptoms and Slight Limitations] : II [More Frequent Use Needed Recently] : Patient reports recent increase in frequency of [___ Times a Week] : [unfilled] time(s) a week [Former] : is a former smoker [None] : None [Adherent] : the patient is adherent with ~his/her~ medication regimen [Goals--Doing Well] : the patient is doing well with ~his/her~ goals [Oxygen] : the patient uses no supplemental oxygen [Good Control] : peak flow has been poor [Side Effects] : ~He/She~ denies medication side effects [FreeTextEntry1] : [facility] The [facility] is doing [facility]. She had a CT scan in August. She was given an inhaler. He rarely uses the inhaler.

## 2019-10-29 NOTE — HISTORY OF PRESENT ILLNESS
[Difficulty Breathing During Exertion] : stable dyspnea on exertion [Feelings Of Weakness On Exertion] : stable exercise intolerance [Cough] : resolved coughing [Wheezing] : stable wheezing [Regional Soft Tissue Swelling Both Lower Extremities] : denies lower extremity edema [Chest Pain Or Discomfort] : denies chest pain [Fever] : denies fever [Wt Gain ___ Lbs] : recent [unfilled] ~Upound(s) weight gain [1  - Very slight] : 1, very slight [Class II - Mild Symptoms and Slight Limitations] : II [More Frequent Use Needed Recently] : Patient reports recent increase in frequency of [___ Times a Week] : [unfilled] time(s) a week [Former] : is a former smoker [None] : None [Adherent] : the patient is adherent with ~his/her~ medication regimen [Goals--Doing Well] : the patient is doing well with ~his/her~ goals [Oxygen] : the patient uses no supplemental oxygen [Good Control] : peak flow has been poor [Side Effects] : ~He/She~ denies medication side effects [FreeTextEntry1] : [facility] The [facility] is doing well. She had a CT scan in August. She was given an inhaler. He rarely uses the inhaler.

## 2019-10-29 NOTE — ASSESSMENT
[FreeTextEntry1] : Stroke of lung disease\par \par The patient was started on Symbicort and Ventolin. The patient is only using Ventolin as needed basis about once a week. The PFT was consistent with mild chronic lung disease. Instructed the patient is to continue on Ventolin as-needed basis.\par \par Pulmonary nodules\par \par The patient had a CT scan in June which was consistent with stability of the pulmonary moderate except there was 9 mm new nodule in the left lower lobe. There is also a stability of the mediastinal lymphadenopathy and the thymus. Patient had a CT scan of the chest in August and not in [facility] confirm the stability of the mediastinal lymph nodes and the nodules. The left lower lobe nodules was resolved the bedside. Patient is to schedule to repeat the CT scan of the chest in February\par \par I discussed the CT scan of the chest with the patient. Is no change in the 7 mm nodule and the mediastinal adenopathy. The thymus abnormality decreased in size. I would repeat the CT scan in one year but I would discuss the case with Dr. Anaya regarding whether to get PET scan. \par \par  \par \par \par It is lymphadenopathy\par \par Above\par \par Pleural effusion\par \par Patient had pleural effusion drained and produces. There is no evidence of recurrence of the pleura disease. 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

## 2019-10-31 ENCOUNTER — APPOINTMENT (OUTPATIENT)
Dept: OTOLARYNGOLOGY | Facility: CLINIC | Age: 54
End: 2019-10-31
Payer: COMMERCIAL

## 2019-10-31 ENCOUNTER — APPOINTMENT (OUTPATIENT)
Dept: SLEEP CENTER | Facility: HOME HEALTH | Age: 54
End: 2019-10-31
Payer: COMMERCIAL

## 2019-10-31 ENCOUNTER — OUTPATIENT (OUTPATIENT)
Dept: OUTPATIENT SERVICES | Facility: HOSPITAL | Age: 54
LOS: 1 days | End: 2019-10-31
Payer: COMMERCIAL

## 2019-10-31 DIAGNOSIS — Z98.1 ARTHRODESIS STATUS: Chronic | ICD-10-CM

## 2019-10-31 DIAGNOSIS — Z98.89 OTHER SPECIFIED POSTPROCEDURAL STATES: Chronic | ICD-10-CM

## 2019-10-31 DIAGNOSIS — I86.8 VARICOSE VEINS OF OTHER SPECIFIED SITES: Chronic | ICD-10-CM

## 2019-10-31 DIAGNOSIS — Z95.828 PRESENCE OF OTHER VASCULAR IMPLANTS AND GRAFTS: Chronic | ICD-10-CM

## 2019-10-31 PROCEDURE — 95800 SLP STDY UNATTENDED: CPT

## 2019-10-31 PROCEDURE — G0400: CPT | Mod: 26

## 2019-10-31 PROCEDURE — 92507 TX SP LANG VOICE COMM INDIV: CPT | Mod: GN

## 2019-11-01 DIAGNOSIS — G47.33 OBSTRUCTIVE SLEEP APNEA (ADULT) (PEDIATRIC): ICD-10-CM

## 2019-11-14 ENCOUNTER — APPOINTMENT (OUTPATIENT)
Dept: OTOLARYNGOLOGY | Facility: CLINIC | Age: 54
End: 2019-11-14
Payer: COMMERCIAL

## 2019-11-14 PROCEDURE — 92524 BEHAVRAL QUALIT ANALYS VOICE: CPT | Mod: GN

## 2019-11-14 PROCEDURE — 31579 LARYNGOSCOPY TELESCOPIC: CPT

## 2019-11-26 ENCOUNTER — APPOINTMENT (OUTPATIENT)
Dept: OTOLARYNGOLOGY | Facility: CLINIC | Age: 54
End: 2019-11-26

## 2019-12-18 ENCOUNTER — APPOINTMENT (OUTPATIENT)
Dept: OTOLARYNGOLOGY | Facility: CLINIC | Age: 54
End: 2019-12-18
Payer: COMMERCIAL

## 2019-12-18 VITALS
HEIGHT: 71 IN | BODY MASS INDEX: 41.02 KG/M2 | SYSTOLIC BLOOD PRESSURE: 140 MMHG | DIASTOLIC BLOOD PRESSURE: 62 MMHG | WEIGHT: 293 LBS | HEART RATE: 70 BPM

## 2019-12-18 DIAGNOSIS — K21.0 GASTRO-ESOPHAGEAL REFLUX DISEASE WITH ESOPHAGITIS: ICD-10-CM

## 2019-12-18 DIAGNOSIS — J32.8 OTHER CHRONIC SINUSITIS: ICD-10-CM

## 2019-12-18 DIAGNOSIS — J06.9 ACUTE UPPER RESPIRATORY INFECTION, UNSPECIFIED: ICD-10-CM

## 2019-12-18 DIAGNOSIS — L29.9 PRURITUS, UNSPECIFIED: ICD-10-CM

## 2019-12-18 DIAGNOSIS — R49.0 DYSPHONIA: ICD-10-CM

## 2019-12-18 PROCEDURE — 99214 OFFICE O/P EST MOD 30 MIN: CPT | Mod: 25

## 2019-12-18 PROCEDURE — 31575 DIAGNOSTIC LARYNGOSCOPY: CPT

## 2019-12-18 RX ORDER — MOMETASONE FUROATE 1 MG/G
0.1 OINTMENT TOPICAL TWICE DAILY
Qty: 1 | Refills: 1 | Status: ACTIVE | COMMUNITY
Start: 2019-12-18 | End: 1900-01-01

## 2019-12-18 NOTE — CONSULT LETTER
[Courtesy Letter:] : I had the pleasure of seeing your patient, [unfilled], in my office today. [Dear  ___] : Dear  [unfilled], [Consult Closing:] : Thank you very much for allowing me to participate in the care of this patient.  If you have any questions, please do not hesitate to contact me. [Sincerely,] : Sincerely, [DrLyndsay ___] : Dr. DUNCAN [DrLyndsay  ___] : Dr. DUNCAN [FreeTextEntry2] : Carrie Miner M.D.\par Pulmonary Division\par [facility]\[place] [address]\par  [FreeTextEntry3] : \par Thelma Burciaga MD \par Otolaryngology, Head and Neck Surgery \par Residency site , [facility]\par  [FreeTextEntry1] : \par \par Enclosed please find my office notes for September 18, 2019. \par \par \par \par \par \par

## 2019-12-18 NOTE — HISTORY OF PRESENT ILLNESS
[de-identified] : Ms. Bowen reports that hoarseness is better.  Infrequent voice breaks now.\par Noted previously to have TVF thickening at impact points bilateral and mucus/edema of both TVFs\par Started voice therapy. \par Reflux is still bad, on Dexilant.\par Rare cough and occasional runny nose\par s/p bilateral endoscopic sinus surgery (maxillary, total ethmoid) and bilateral inferior turbinate reduction for chronic sinusitis and inferior turbinate hypertrophy on September 25, 2018, at [facility]. Small defect in right lamina papyracea at time of surgery.\par Bilateral hearing loss, worse on left side, for years.  No tinnitus or vertigol.\par

## 2019-12-18 NOTE — ASSESSMENT
[FreeTextEntry1] : Ms. Bowen was evaluated for the following:\par \par 1.) hoarseness is much improved - \par 2.) reflux - not completely controlled on Dexilant\par \par PLAN:\par - continue voice therapy\par - continue reflux precautions and Dexilant\par - F/up with  Dr. Grimes and Dr. Kulkarni\par \par Return in 3 months\par

## 2019-12-18 NOTE — PHYSICAL EXAM
[Normal] : no neck adenopathy [FreeTextEntry1] : Mild raspy voice.  No voice breaks. No dyspnea. [de-identified] : Maxillary and ethmoid cavities are patent. [de-identified] : Frothy clear secretions in the oropharynx. [de-identified] : 1+ bilateral

## 2019-12-26 PROBLEM — J32.8 OTHER CHRONIC SINUSITIS: Status: ACTIVE | Noted: 2018-05-23

## 2019-12-26 RX ORDER — TERIPARATIDE 250 UG/ML
600 INJECTION, SOLUTION SUBCUTANEOUS
Qty: 24 | Refills: 0 | Status: DISCONTINUED | COMMUNITY
Start: 2017-12-21 | End: 2019-12-18

## 2019-12-26 RX ORDER — ALENDRONATE SODIUM 70 MG/1
70 TABLET ORAL
Refills: 0 | Status: ACTIVE | COMMUNITY

## 2019-12-26 RX ORDER — ASCORBIC ACID
200 CRYSTALS ORAL
Refills: 0 | Status: DISCONTINUED | COMMUNITY
End: 2019-12-18

## 2019-12-26 NOTE — CONSULT LETTER
[Consult Closing:] : Thank you very much for allowing me to participate in the care of this patient.  If you have any questions, please do not hesitate to contact me. [Courtesy Letter:] : I had the pleasure of seeing your patient, [unfilled], in my office today. [Dear  ___] : Dear  [unfilled], [FreeTextEntry1] : \par \par Enclosed please find my office notes for December 18, 2019. \par \par \par \par \par \par  [FreeTextEntry2] : Carrie Miner M.D.\par Pulmonary Division\par [facility]\[place] [address]\par  [Sincerely,] : Sincerely, [FreeTextEntry3] : \par Thelma Burciaga MD \par Otolaryngology, Head and Neck Surgery \par Residency site , [facility]\par  [DrLyndsay  ___] : Dr. DUNCAN [DrLyndsay ___] : Dr. DUNCAN

## 2019-12-26 NOTE — PROCEDURE
[de-identified] : \par Indication:  reflux\par -Verbal consent was obtained from patient prior to procedure.\par -Juan David-Synephrine and lidocaine 2% spray applied to the nasal cavities.\par Flexible laryngoscopy was performed via  right nostril and revealed the following:\par   -- Nasopharynx had no mass or exudate.\par   -- Base of tongue was symmetric and not enlarged.\par   -- Vallecula was clear\par   -- Epiglottis, both aryepiglottic folds and both false vocal folds were normal\par   -- Arytenoids both with moderate edema and erythema \par   -- True vocal folds were fully mobile and minimal edema; no lesions. \par   -- Post cricoid area was clear.\par   -- Interarytenoid edema was  present.  Frothy secretions in hypopharynx.\par \par The patient tolerated the procedure well.\par \par

## 2019-12-26 NOTE — ASSESSMENT
[FreeTextEntry1] : Ms. Bowen was evaluated for the following:\par \par 1.) hoarseness is basically resolved.  She is happy with current voice.\par 2.) reflux - not completely controlled on Dexilant.  Is trying to wait 4 hours after eating before lying down\par 3.) Chronic ear itching - will try ointment instead of oil\par 4.) Acute URI\par 5.) Chronic sinusitis - rare sx now.\par \par PLAN:\par - continue reflux precautions and Dexilant\par - mometasone ointment to skin of ears BID prn itch\par - rest and hydration for URI\par \par Return in 6 months\par

## 2019-12-26 NOTE — PHYSICAL EXAM
[Laryngoscopy Performed] : laryngoscopy was performed, see procedure section for findings [Normal] : no neck adenopathy [FreeTextEntry1] : No hoarseness. [de-identified] : EACS clear; skin dry. [de-identified] : Clear mucus bilateral. [de-identified] : Edema inferior turbinates bilateral. [de-identified] : 1+ bilateral  [de-identified] : Frothy clear secretions in the oropharynx.

## 2020-06-24 ENCOUNTER — APPOINTMENT (OUTPATIENT)
Dept: OTOLARYNGOLOGY | Facility: CLINIC | Age: 55
End: 2020-06-24

## 2020-07-18 ENCOUNTER — APPOINTMENT (OUTPATIENT)
Dept: CT IMAGING | Facility: HOSPITAL | Age: 55
End: 2020-07-18
Payer: COMMERCIAL

## 2020-07-18 ENCOUNTER — OUTPATIENT (OUTPATIENT)
Dept: OUTPATIENT SERVICES | Facility: HOSPITAL | Age: 55
LOS: 1 days | End: 2020-07-18
Payer: COMMERCIAL

## 2020-07-18 ENCOUNTER — RESULT REVIEW (OUTPATIENT)
Age: 55
End: 2020-07-18

## 2020-07-18 DIAGNOSIS — Z98.1 ARTHRODESIS STATUS: Chronic | ICD-10-CM

## 2020-07-18 DIAGNOSIS — Z95.828 PRESENCE OF OTHER VASCULAR IMPLANTS AND GRAFTS: Chronic | ICD-10-CM

## 2020-07-18 DIAGNOSIS — I86.8 VARICOSE VEINS OF OTHER SPECIFIED SITES: Chronic | ICD-10-CM

## 2020-07-18 DIAGNOSIS — Z98.89 OTHER SPECIFIED POSTPROCEDURAL STATES: Chronic | ICD-10-CM

## 2020-07-18 PROCEDURE — 71250 CT THORAX DX C-: CPT | Mod: 26

## 2020-07-18 PROCEDURE — 71250 CT THORAX DX C-: CPT

## 2020-09-22 ENCOUNTER — APPOINTMENT (OUTPATIENT)
Dept: INTERNAL MEDICINE | Facility: CLINIC | Age: 55
End: 2020-09-22
Payer: MEDICARE

## 2020-09-22 PROCEDURE — 97803 MED NUTRITION INDIV SUBSEQ: CPT

## 2020-09-23 VITALS — BODY MASS INDEX: 40.45 KG/M2 | WEIGHT: 290 LBS

## 2020-09-24 ENCOUNTER — APPOINTMENT (OUTPATIENT)
Dept: ENDOCRINOLOGY | Facility: CLINIC | Age: 55
End: 2020-09-24
Payer: COMMERCIAL

## 2020-09-24 VITALS
HEART RATE: 70 BPM | WEIGHT: 293 LBS | SYSTOLIC BLOOD PRESSURE: 119 MMHG | HEIGHT: 71 IN | DIASTOLIC BLOOD PRESSURE: 75 MMHG | BODY MASS INDEX: 41.02 KG/M2

## 2020-09-24 DIAGNOSIS — E03.9 HYPOTHYROIDISM, UNSPECIFIED: ICD-10-CM

## 2020-09-24 PROCEDURE — 99205 OFFICE O/P NEW HI 60 MIN: CPT | Mod: 25

## 2020-09-24 PROCEDURE — 82962 GLUCOSE BLOOD TEST: CPT

## 2020-09-25 NOTE — END OF VISIT
[FreeTextEntry3] : All medical record entries made by the Scribe were at my, Dr. Juan Francisco Acosta, direction and personally dictated by me on 09/24/2020. I have reviewed the chart and agree that the record accurately reflects my personal performance of the history, physical exam, assessment and plan. I have also personally directed, reviewed and agreed with the chart.  [Time Spent: ___ minutes] : I have spent [unfilled] minutes of time on the encounter. [>50% of the face to face encounter time was spent on counseling and/or coordination of care for ___] : Greater than 50% of the face to face encounter time was spent on counseling and/or coordination of care for [unfilled]

## 2020-09-25 NOTE — HISTORY OF PRESENT ILLNESS
[FreeTextEntry1] : 56 y/o F pt, former pt of Dr. Serrato, with Hx of Hashimoto hypothyroidism (dx >20 yrs ago), referred by Dr. Carlyn Grimes, presents today to establish endocrine care with me. \par Significant  PMHx: DM (dx in ~2015), Osteoporosis (bone fx in ~2018), GERD, Autoimmune Hepatitis, Liver Bypass. Multiple surgeries. \par FHx: DM (mother), Thyroid disorder (mother, sister, aunt), Osteoporosis, Heart disease. \par SHx: Former smoker (quit On disability. \par Lifestyle: Eats 2 meals a day (lunch & dinner). Checks FBS almost daily. \par LMP: ~10 yrs ago (no menses since "lining surgery". Pt has never been pregnant. \par Last ophthalmologist visit: 2019\par Follows with hepatologist regularly. \par NKDA\par \par 09/24/2020\par - Review of Pulmonary and ENT notes:Received voice therapy. Hx of GERD. s/p b/l endoscopic sinus surgery and b/l inferior turbinate reduction for chronic sinusitis. \par - Review of Gita GOMEZ, [place] [facility] [place]:  7 mm nodule and mediastinal adenopathy, and resolution of thymic hyperplasia. Evaluation for morbid obesity; need to decrease BMI for liver transplant. \par \par Pt had been following with Dr. Serrato for 4-5 years until he retired 3 months ago. \par She was dx with Hashimoto >20 yrs ago. Her hepatologist reportedly increased her Levothyroxine from 200 mcg to 250 mcg 3 weeks ago because of suspicion that her ascites was caused by insufficient thyroid supplementation. Pt states that she has been on up to 300 mcg in the past. \par She was dx with DM 4-5 years ago. She was started on Ozempic 6 months ago because she had been gaining a lot of weight. \par \par Pt presents today with POCT 182 feeling like "throwing up with so many medications". Pt checks FBS almost daily at 9 AM and notes FBS from <100 to up to 200s (145 this morning). She does not check postprandial BS. Pt reports of occasional blurred vision. \par Denies polyuria, nocturia, CP, trouble breathing and dizziness. \par \par Current Medications: Levothyroxine 250mcg QD (increased ~early 9/2020), Ozempic QW (since ~3/2020), Repaglinide 1mg ac, Alendronate 70mg QW, Ursodiol 300x3 BID, Metoprolol 50mg QD, Spironolactone 200mg BID, Gabapentin 300mg TID, Furosemide 80mg BID, Xifaxan 550mg BID, Mycophenolate, Dexilant 60mg QD\par \par Labs:

## 2020-09-25 NOTE — ASSESSMENT
[FreeTextEntry1] : 54 y/o F pt with:\par \par 1. Hx of Hashimoto hypothyroidism (dx 20 yrs ago):\par Pt appears to be euthyroid. Thyroid exam is normal.\par She is on Levothyroxine 250 mcg which was increased from 200 mcg early this year. \par Pt appears slightly surprised to learn on how to take thyroid supplementation. Will continue with same dose and reassess the dose in 2 months. \par \par 2. Hx of DM (dx in ~2015)\par Pt has multiple other dx including autoimmune hepatitis. Little information on DM related complications.\par She is not losing weight. No GI pain or discomfort. \par For the most part, she eats 2 meals a day. \par Assess for DM. Will obtain glucose records. \par Sent labs today; will call pt with results. \par Had brief discussion insulin treatment for DM and hypoglycemia prevention. \par \par Return in: 2 weeks

## 2020-09-25 NOTE — ADDENDUM
[FreeTextEntry1] : I, Quyen Hagen, acted solely as a scribe for Dr. Juan Francisco Acosta on this date. 09/24/2020.

## 2020-09-25 NOTE — PHYSICAL EXAM
[Alert] : alert [Normal Sclera/Conjunctiva] : normal sclera/conjunctiva [Normal Outer Ear/Nose] : the ears and nose were normal in appearance [No Neck Mass] : no neck mass was observed [Thyroid Not Enlarged] : the thyroid was not enlarged [No Respiratory Distress] : no respiratory distress [Clear to Auscultation] : lungs were clear to auscultation bilaterally [Normal Rate] : heart rate was normal [Regular Rhythm] : with a regular rhythm [Spine Straight] : spine straight [Normal Gait] : normal gait [Right Foot Was Examined] : right foot ~C was examined [Left Foot Was Examined] : left foot ~C was examined [2+] : 2+ in the dorsalis pedis [Normal Reflexes] : deep tendon reflexes were 2+ and symmetric [Oriented x3] : oriented to person, place, and time [Acanthosis Nigricans] : no acanthosis nigricans [de-identified] : periorbital edema [de-identified] : non pitting edema [de-identified] : abdomen distended, no tenderness [de-identified] : LE hyperpigmentation (R>L)

## 2020-09-25 NOTE — REVIEW OF SYSTEMS
[Recent Weight Gain (___ Lbs)] : recent weight gain: [unfilled] lbs [Blurred Vision] : blurred vision [Negative] : Heme/Lymph [Chest Pain] : no chest pain [Difficulty Breathing] : no dyspnea [Polyuria] : no polyuria [Nocturia] : no nocturia [Dizziness] : no dizziness

## 2020-10-01 LAB — GLUCOSE BLDC GLUCOMTR-MCNC: 182

## 2020-10-05 ENCOUNTER — NON-APPOINTMENT (OUTPATIENT)
Age: 55
End: 2020-10-05

## 2020-10-12 ENCOUNTER — NON-APPOINTMENT (OUTPATIENT)
Age: 55
End: 2020-10-12

## 2020-10-14 ENCOUNTER — APPOINTMENT (OUTPATIENT)
Dept: ENDOCRINOLOGY | Facility: CLINIC | Age: 55
End: 2020-10-14
Payer: COMMERCIAL

## 2020-10-14 VITALS
BODY MASS INDEX: 41.02 KG/M2 | SYSTOLIC BLOOD PRESSURE: 132 MMHG | HEART RATE: 73 BPM | HEIGHT: 71 IN | WEIGHT: 293 LBS | DIASTOLIC BLOOD PRESSURE: 74 MMHG

## 2020-10-14 PROCEDURE — 82962 GLUCOSE BLOOD TEST: CPT

## 2020-10-14 PROCEDURE — 99214 OFFICE O/P EST MOD 30 MIN: CPT | Mod: 25

## 2020-10-15 NOTE — PHYSICAL EXAM
[Alert] : alert [Normal Sclera/Conjunctiva] : normal sclera/conjunctiva [Normal Outer Ear/Nose] : the ears and nose were normal in appearance [No Neck Mass] : no neck mass was observed [Thyroid Not Enlarged] : the thyroid was not enlarged [No Respiratory Distress] : no respiratory distress [Normal Rate] : heart rate was normal [Clear to Auscultation] : lungs were clear to auscultation bilaterally [Regular Rhythm] : with a regular rhythm [Spine Straight] : spine straight [Normal Gait] : normal gait [Right Foot Was Examined] : right foot ~C was examined [Left Foot Was Examined] : left foot ~C was examined [2+] : 2+ in the dorsalis pedis [Normal Reflexes] : deep tendon reflexes were 2+ and symmetric [Oriented x3] : oriented to person, place, and time [Acanthosis Nigricans] : no acanthosis nigricans [de-identified] : periorbital edema [de-identified] : abdomen distended, no tenderness [de-identified] : non pitting edema [de-identified] : LE hyperpigmentation (R>L)

## 2020-10-15 NOTE — END OF VISIT
[Time Spent: ___ minutes] : I have spent [unfilled] minutes of time on the encounter. [>50% of the face to face encounter time was spent on counseling and/or coordination of care for ___] : Greater than 50% of the face to face encounter time was spent on counseling and/or coordination of care for [unfilled] [FreeTextEntry3] : All medical record entries made by the Scribe were at my, Dr. Juan Francisco Acosta, direction and personally dictated by me on 10/14/2020. I have reviewed the chart and agree that the record accurately reflects my personal performance of the history, physical exam, assessment and plan. I have also personally directed, reviewed and agreed with the chart.

## 2020-10-15 NOTE — ASSESSMENT
[Carbohydrate Consistent Diet] : carbohydrate consistent diet [Importance of Diet and Exercise] : importance of diet and exercise to improve glycemic control, achieve weight loss and improve cardiovascular health [Exercise/Effect on Glucose] : exercise/effect on glucose [Self Monitoring of Blood Glucose] : self monitoring of blood glucose [FreeTextEntry1] : 56 y/o F pt with:\par \par 1. Hx of Hashimoto hypothyroidism (dx 20 yrs ago):\par Pt is clinically euthyroid. \par She is currently on Levothyroxine 250 mcg. Will decrease Levothyroxine to 200 mcg (Free T4 2.0 on 10/10/20). \par \par 2. Hx of DM (dx in ~2015), uncontrol\par Continue assessing for comorbidities and complications. \par Glucose levels range from 90's 190 fasting this related to inconsistent diet/food portions\par Recent A1c of 6.2% which does not correlate with home glucose records. \par Continue on GLP1 and repaglinide 1 mg with breakfast,lunch and diner.\par RD visit\par Return in  2 months

## 2020-10-15 NOTE — HISTORY OF PRESENT ILLNESS
[FreeTextEntry1] : 56 y/o F pt, former pt of Dr. Serrato, with Hx of Hashimoto hypothyroidism (dx >20 yrs ago). She was evaluated at [facility] for autoimmune disorders, and confirm Hashimoto hypothyroidism \par Significant  PMHx: DM (dx in ~2015), Osteoporosis (bone fx in ~2018), GERD, Autoimmune Hepatitis, Liver Bypass. Multiple surgeries. \par FHx: DM (mother), Thyroid disorder (mother, sister, aunt), Osteoporosis, Heart disease. \par SHx: Former smoker (quit On disability. \par Lifestyle: Eats 2 meals a day (lunch & dinner). Checks FBS almost daily. \par LMP: ~10 yrs ago (no menses since "lining surgery". Pt has never been pregnant. \par Last ophthalmologist visit: 2019\par Follows with hepatologist regularly. \par \par 09/24/2020\par - Review of Pulmonary and ENT notes:Received voice therapy. Hx of GERD. s/p b/l endoscopic sinus surgery and b/l inferior turbinate reduction for chronic sinusitis. \par - Review of Gita GOMEZ, notes from [facility] [place] program:  7 mm nodule and mediastinal adenopathy, and resolution of thymic hyperplasia. Evaluation for morbid obesity; need to decrease BMI for liver transplant. \par Pt had been following with Dr. Serrato for 4-5 years until he retired 3 months ago. \par She was dx with Hashimoto >20 yrs ago. Her hepatologist reportedly increased her Levothyroxine from 200 mcg to 250 mcg 3 weeks ago because of suspicion that her ascites was caused by insufficient thyroid supplementation. Pt states that she has been on up to 300 mcg in the past. \par She was dx with DM 4-5 years ago. She was started on Ozempic 6 months ago because she had been gaining a lot of weight. \par Pt presents today with POCT 182 feeling like "throwing up with so many medications". Pt checks FBS almost daily at 9 AM and notes FBS from <100 to up to 200s (145 this morning). She does not check postprandial BS. Pt reports of occasional blurred vision. \par Denies polyuria, nocturia, CP, trouble breathing and dizziness. \par \par 10/14/2020\par Pt presents today with POCT 119 for DM f/u, with c/o palpitations.\par Review of BS records: , 220, 140, 145, 199, 318, 255, 105, 220, 180, 101, 155, 254, 125, 206, 198. Postprandial , 280, 128, 200, 170, 189, 290, 231, 178, 197, 181, 125, 254, 102, 141, 198. \par \par Current Medications: Levothyroxine 250 mcg QD (increased ~early 9/2020), Ozempic QW (since ~3/2020), Repaglinide 1 mg ac, Alendronate 70 mg QW, Ursodiol 300x3 BID, Metoprolol 50mg QD, Spironolactone 200 mg BID, Gabapentin 300 mg TID, Furosemide 80 mg BID, Xifaxan 550 mg BID, Mycophenolate, Dexilant 60 mg QD\par \par Labs: \par - 10/10/20: A1c 6.2%, s.creat 0.85, TG 88, LDL-c 93, TSH 6.08, Free T4 2.0 (H)

## 2020-10-15 NOTE — ADDENDUM
[FreeTextEntry1] : I, Quyen Hagen, acted solely as a scribe for Dr. Juan Francisco Acosta on this date. 10/14/2020.

## 2020-10-18 LAB
ALBUMIN SERPL ELPH-MCNC: 4.2 G/DL
ALP BLD-CCNC: 211 U/L
ALT SERPL-CCNC: 47 U/L
ANION GAP SERPL CALC-SCNC: 14 MMOL/L
AST SERPL-CCNC: 65 U/L
BILIRUB SERPL-MCNC: 2.1 MG/DL
BUN SERPL-MCNC: 13 MG/DL
CALCIUM SERPL-MCNC: 9.3 MG/DL
CHLORIDE SERPL-SCNC: 98 MMOL/L
CHOLEST SERPL-MCNC: 156 MG/DL
CHOLEST/HDLC SERPL: 3.4 RATIO
CO2 SERPL-SCNC: 26 MMOL/L
CREAT SERPL-MCNC: 0.85 MG/DL
CREAT SPEC-SCNC: 39 MG/DL
ESTIMATED AVERAGE GLUCOSE: 131 MG/DL
GLUCOSE BLDC GLUCOMTR-MCNC: 119
GLUCOSE SERPL-MCNC: 154 MG/DL
HBA1C MFR BLD HPLC: 6.2 %
HDLC SERPL-MCNC: 46 MG/DL
LDLC SERPL CALC-MCNC: 93 MG/DL
MICROALBUMIN 24H UR DL<=1MG/L-MCNC: <1.2 MG/DL
MICROALBUMIN/CREAT 24H UR-RTO: NORMAL MG/G
POTASSIUM SERPL-SCNC: 3.6 MMOL/L
PROT SERPL-MCNC: 6.8 G/DL
SODIUM SERPL-SCNC: 138 MMOL/L
T4 FREE SERPL-MCNC: 2 NG/DL
TRIGL SERPL-MCNC: 88 MG/DL
TSH SERPL-ACNC: 6.08 UIU/ML

## 2020-11-25 ENCOUNTER — APPOINTMENT (OUTPATIENT)
Dept: ENDOCRINOLOGY | Facility: CLINIC | Age: 55
End: 2020-11-25

## 2020-12-21 PROBLEM — J06.9 ACUTE URI: Status: RESOLVED | Noted: 2019-12-18 | Resolved: 2020-12-21

## 2020-12-23 ENCOUNTER — APPOINTMENT (OUTPATIENT)
Dept: ENDOCRINOLOGY | Facility: CLINIC | Age: 55
End: 2020-12-23

## 2021-05-05 ENCOUNTER — APPOINTMENT (OUTPATIENT)
Dept: ENDOCRINOLOGY | Facility: CLINIC | Age: 56
End: 2021-05-05
Payer: COMMERCIAL

## 2021-05-05 VITALS
DIASTOLIC BLOOD PRESSURE: 77 MMHG | HEIGHT: 71 IN | SYSTOLIC BLOOD PRESSURE: 131 MMHG | WEIGHT: 280 LBS | HEART RATE: 68 BPM | BODY MASS INDEX: 39.2 KG/M2

## 2021-05-05 PROCEDURE — 99215 OFFICE O/P EST HI 40 MIN: CPT | Mod: 25

## 2021-05-05 PROCEDURE — 82962 GLUCOSE BLOOD TEST: CPT

## 2021-05-05 PROCEDURE — 99072 ADDL SUPL MATRL&STAF TM PHE: CPT

## 2021-05-05 PROCEDURE — 83036 HEMOGLOBIN GLYCOSYLATED A1C: CPT | Mod: QW

## 2021-05-05 RX ORDER — 70%ISOPROPYL ALCOHOL 0.7 ML/ML
70 SWAB TOPICAL
Qty: 300 | Refills: 2 | Status: ACTIVE | COMMUNITY
Start: 2021-05-05 | End: 1900-01-01

## 2021-05-05 RX ORDER — REPAGLINIDE 1 MG/1
1 TABLET ORAL
Refills: 0 | Status: DISCONTINUED | COMMUNITY
End: 2021-05-05

## 2021-05-07 ENCOUNTER — APPOINTMENT (OUTPATIENT)
Dept: ENDOCRINOLOGY | Facility: CLINIC | Age: 56
End: 2021-05-07
Payer: COMMERCIAL

## 2021-05-07 LAB
25(OH)D3 SERPL-MCNC: 65.2 NG/ML
ALBUMIN SERPL ELPH-MCNC: 3.9 G/DL
ALP BLD-CCNC: 268 U/L
ALT SERPL-CCNC: 54 U/L
ANION GAP SERPL CALC-SCNC: 16 MMOL/L
AST SERPL-CCNC: 86 U/L
BILIRUB SERPL-MCNC: 3.6 MG/DL
BUN SERPL-MCNC: 15 MG/DL
CALCIUM SERPL-MCNC: 9.2 MG/DL
CHLORIDE SERPL-SCNC: 94 MMOL/L
CHOLEST SERPL-MCNC: 203 MG/DL
CO2 SERPL-SCNC: 25 MMOL/L
CREAT SERPL-MCNC: 0.87 MG/DL
ESTIMATED AVERAGE GLUCOSE: 258 MG/DL
GLUCOSE BLDC GLUCOMTR-MCNC: 250
GLUCOSE SERPL-MCNC: 217 MG/DL
HBA1C MFR BLD HPLC: 10.2
HBA1C MFR BLD HPLC: 10.6 %
HDLC SERPL-MCNC: 43 MG/DL
LDLC SERPL CALC-MCNC: 131 MG/DL
NONHDLC SERPL-MCNC: 159 MG/DL
POTASSIUM SERPL-SCNC: 3.3 MMOL/L
PROT SERPL-MCNC: 7 G/DL
SODIUM SERPL-SCNC: 136 MMOL/L
T3FREE SERPL-MCNC: 1.43 PG/ML
T4 FREE SERPL-MCNC: 1.6 NG/DL
THYROGLOB AB SERPL-ACNC: 3459 IU/ML
THYROGLOB SERPL-MCNC: <0.2 NG/ML
TRIGL SERPL-MCNC: 140 MG/DL
TSH SERPL-ACNC: 12.2 UIU/ML

## 2021-05-07 PROCEDURE — 99214 OFFICE O/P EST MOD 30 MIN: CPT | Mod: 95

## 2021-05-07 NOTE — END OF VISIT
[Time Spent: ___ minutes] : I have spent [unfilled] minutes of time on the encounter. [FreeTextEntry3] : All medical record entries made by the Scribe were at my, Dr. Juan Francisco Acosta, direction and personally dictated by me on 05/05/2021. I have reviewed the chart and agree that the record accurately reflects my personal performance of the history, physical exam, assessment and plan. I have also personally directed, reviewed and agreed with the chart.

## 2021-05-07 NOTE — REVIEW OF SYSTEMS
[Blurred Vision] : blurred vision [Nausea] : nausea [Polyuria] : polyuria [Joint Pain] : joint pain [Headaches] : headaches [Negative] : Psychiatric [Dysphagia] : no dysphagia [Chest Pain] : no chest pain [Palpitations] : no palpitations [Shortness Of Breath] : no shortness of breath [Cold Intolerance] : no cold intolerance [FreeTextEntry2] : very tired

## 2021-05-07 NOTE — PHYSICAL EXAM
[Alert] : alert [Normal Sclera/Conjunctiva] : normal sclera/conjunctiva [Normal Outer Ear/Nose] : the ears and nose were normal in appearance [No Respiratory Distress] : no respiratory distress [Clear to Auscultation] : lungs were clear to auscultation bilaterally [Normal Rate] : heart rate was normal [Regular Rhythm] : with a regular rhythm [Spine Straight] : spine straight [Normal Gait] : normal gait [Right Foot Was Examined] : right foot ~C was examined [Left Foot Was Examined] : left foot ~C was examined [2+] : 2+ in the dorsalis pedis [Normal Reflexes] : deep tendon reflexes were 2+ and symmetric [Oriented x3] : oriented to person, place, and time [Acanthosis Nigricans] : no acanthosis nigricans [de-identified] : R thyroid lobe palpated with increased consistency [de-identified] : periorbital edema [de-identified] : 1+ pitting edema b/l [de-identified] : abdomen distended, no tenderness [de-identified] : LE hyperpigmentation (R>L)

## 2021-05-07 NOTE — ASSESSMENT
[Carbohydrate Consistent Diet] : carbohydrate consistent diet [Importance of Diet and Exercise] : importance of diet and exercise to improve glycemic control, achieve weight loss and improve cardiovascular health [Hypoglycemia Management] : hypoglycemia management [Self Monitoring of Blood Glucose] : self monitoring of blood glucose [Levothyroxine] : The patient was instructed to take Levothyroxine on an empty stomach, separate from vitamins, and wait at least 30 minutes before eating [FreeTextEntry1] : 54 y/o F pt with:\par \par 1. DM (dx in ~2015), uncontrolled with Hx of autoimmune hepatitis, thrombocytopenia and obesity: \par TIPS is failing. Plans for removal this week. \par Her diabetes is worsening 5/5/21, A1c 10.6%. \par Ozempic and Repaglinide held. Increase MDI , Basaglar 30 u qpm and Fiasp 9 u ac.\par Advised pt to monitor pre and postprandials\par \par 2.  Hashimoto hypothyroidism (dx 20 yrs ago), worsening\par Recent TSH of 12.2, normal t4 free and low t3 free. Continue levothyroxine 275 mcg qd\par For the time, will continue with Levothyroxine 275 mcg ( Pt explained how to take levothyroxine ). Repeat TFT  5/17/21\par \par Return in  4 weeks

## 2021-05-07 NOTE — HISTORY OF PRESENT ILLNESS
[Home] : at home, [unfilled] , at the time of the visit. [Medical Office: ([facility])___] : at the medical office located in  [Verbal consent obtained from patient] : the patient, [unfilled] [FreeTextEntry1] : 56 y/o F pt, with Hx of DM (dx in ~2015), and Hx of Hashimoto hypothyroidism (dx >20 yrs ago). 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 Multiple surgeries including b/l endoscopic sinus surgery and b/l inferior turbinate reduction for chronic sinusitis. Thrombocytopenia (platelet inj multiple times a year)\par FHx: DM (mother), Thyroid disorder (mother, sister, aunt), Osteoporosis, Heart disease. \par SHx: Former smoker (quit On disability. \par Lifestyle: Eats 2 meals a day (lunch & dinner). Checks FBS almost daily. \par LMP: ~10 yrs ago (no menses since "lining surgery"). Pt has never been pregnant. \par Last ophthalmologist visit: 2019\par Follows with hepatologist regularly. \par \par 05/05/2021 \par Pt presents today with POCT 250, POCT A1c 10.2%, /77 and BMI 39.05 for endocrine f/u. She went to [place] from 2/2021 - 4/16/21 to tend for her sick mother and attend funerals of her mother and sister. She saw her hepatologist here on 4/26/21 who informed that her liver is not doing good and she was sent for MRI. \par Pt had ascites fluid drained 5 times between 8/2020 - 12/2020. She also had TIPS liver shunt revision done.\par Pt receives platelet infusion multiple times in a year. She received one infusion while she was in [place] in early 20201. \par She checked her FBS twice today and it was reportedly 190 and 200s. She has lost 14 lbs in last 7 months. \par \par Current Medications: Ozempic qw (since ~3/2020), Repaglinide 1 mg ac, Levothyroxine 275 mcg qd (increased in ~2/2021), Alendronate 70 mg qw, Ursodiol 300x3 bid, Metoprolol 50mg qd, Spironolactone 200 mg bid, Gabapentin 300 mg tid, Furosemide 80 mg bid, Xifaxan 550 mg bid, Mycophenolate, Dexilant 60 mg qd\par \par 5/7/21 telehealth\par She did not have question prior to today's visit\par She start a new insulin regimen, she is feeling better,and fasting reading in the 160's \par Denies osmotic diuresis symptoms. No cold intolerance\par Labs:\par 5/5/21 : TSH 12.2, T4 free 1.6 and t3 free 1.43 ( 1.8-4.6 ), an A1c 10.9%\par  - 04/26/21: A1c 10.77%, Glucose 363, s.creat 0.84, AST 68, ALT 48, ALK PHOS 258, TSH 21.7, Free T4 1.7, Total T4 11.4, T3 59, Vit D 25-OH 60.1\par - 10/10/20: A1c 6.2%, s.creat 0.85, LDL-c 93,  TG 88, TSH 6.08, Free T4 2.0 (H)

## 2021-05-07 NOTE — ADDENDUM
[FreeTextEntry1] : I, Quyen Hagen, acted solely as a scribe for Dr. Juan Francisco Acosta on this date. 05/05/2021.

## 2021-05-07 NOTE — HISTORY OF PRESENT ILLNESS
[FreeTextEntry1] : 54 y/o F pt, with Hx of DM (dx in ~2015), and Hx of Hashimoto hypothyroidism (dx >20 yrs ago). 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 Multiple surgeries including b/l endoscopic sinus surgery and b/l inferior turbinate reduction for chronic sinusitis. Thrombocytopenia (platelet inj multiple times a year)\par FHx: DM (mother), Thyroid disorder (mother, sister, aunt), Osteoporosis, Heart disease. \par SHx: Former smoker (quit On disability. \par Lifestyle: Eats 2 meals a day (lunch & dinner). Checks FBS almost daily. \par LMP: ~10 yrs ago (no menses since "lining surgery"). Pt has never been pregnant. \par Last ophthalmologist visit: 2019\par Follows with hepatologist regularly. \par \par 05/05/2021 \par Pt presents today with POCT 250, POCT A1c 10.2%, /77 and BMI 39.05 for endocrine f/u. She went to [place] from 2/2021 - 4/16/21 to tend for her sick mother and attend funerals of her mother and sister. She saw her hepatologist here on 4/26/21 who informed that her liver is not doing good and she was sent for MRI. \par Pt had ascites fluid drained 5 times between 8/2020 - 12/2020. She also had TIPS liver shunt revision done.\par Pt receives platelet infusion multiple times in a year. She received one infusion while she was in [place] in early 20201. \par She checked her FBS twice today and it was reportedly 190 and 200s. She has lost 14 lbs in last 7 months. \par \par Current Medications: Ozempic qw (since ~3/2020), Repaglinide 1 mg ac, Levothyroxine 275 mcg qd (increased in ~2/2021), Alendronate 70 mg qw, Ursodiol 300x3 bid, Metoprolol 50mg qd, Spironolactone 200 mg bid, Gabapentin 300 mg tid, Furosemide 80 mg bid, Xifaxan 550 mg bid, Mycophenolate, Dexilant 60 mg qd\par \par Labs: \par - 05/05/21: POCT A1c 10.2%\par - 04/26/21: A1c 10.77%, Glucose 363, s.creat 0.84, AST 68, ALT 48, ALK PHOS 258, TSH 21.7, Free T4 1.7, Total T4 11.4, T3 59, Vit D 25-OH 60.1\par - 10/10/20: A1c 6.2%, s.creat 0.85, LDL-c 93,  TG 88, TSH 6.08, Free T4 2.0 (H)

## 2021-05-07 NOTE — ASSESSMENT
[FreeTextEntry1] : 54 y/o F pt with:\par \par 1. DM (dx in ~2015), uncontrolled with Hx of autoimmune hepatitis, thrombocytopenia and obesity: \par TIPS is failing. Plans for removal this week. \par Her DM is worsening for past 3 months. POCT A1c of 10.2% today. \par Will hold Ozempic and Repaglinide. Will initiate pt on MDI: Basaglar 22 u qpm and Fiasp 7 u ac.\par Advised pt to monitor BS. Will contact pt to adjust medications as needed. \par Spoke to pt's internist Dr. Kulkarni. \par \par 2.  Hashimoto hypothyroidism (dx 20 yrs ago), worsening\par Recent TSH of 21.7. For the time, will continue with Levothyroxine 275 mcg. \par Sent TFTs including Free T3 considering that deiodinase might be inhibited by pt's medical condition; will call pt with results. \par \par Return in  4 weeks [Carbohydrate Consistent Diet] : carbohydrate consistent diet [Importance of Diet and Exercise] : importance of diet and exercise to improve glycemic control, achieve weight loss and improve cardiovascular health [Hypoglycemia Management] : hypoglycemia management [Self Monitoring of Blood Glucose] : self monitoring of blood glucose

## 2021-05-25 ENCOUNTER — APPOINTMENT (OUTPATIENT)
Dept: ENDOCRINOLOGY | Facility: CLINIC | Age: 56
End: 2021-05-25
Payer: COMMERCIAL

## 2021-05-25 VITALS
SYSTOLIC BLOOD PRESSURE: 114 MMHG | DIASTOLIC BLOOD PRESSURE: 60 MMHG | WEIGHT: 272 LBS | HEART RATE: 64 BPM | BODY MASS INDEX: 37.94 KG/M2

## 2021-05-25 PROCEDURE — 99214 OFFICE O/P EST MOD 30 MIN: CPT | Mod: 25

## 2021-05-25 PROCEDURE — 99072 ADDL SUPL MATRL&STAF TM PHE: CPT

## 2021-05-25 PROCEDURE — 82962 GLUCOSE BLOOD TEST: CPT

## 2021-05-27 NOTE — END OF VISIT
[FreeTextEntry3] : All medical record entries made by the Scribe were at my, Dr. Juan Francisco Acosta, direction and personally dictated by me on 05/25/2021. I have reviewed the chart and agree that the record accurately reflects my personal performance of the history, physical exam, assessment and plan. I have also personally directed, reviewed and agreed with the chart.  [Time Spent: ___ minutes] : I have spent [unfilled] minutes of time on the encounter.

## 2021-05-27 NOTE — PHYSICAL EXAM
[Alert] : alert [Normal Sclera/Conjunctiva] : normal sclera/conjunctiva [Normal Outer Ear/Nose] : the ears and nose were normal in appearance [No Respiratory Distress] : no respiratory distress [Clear to Auscultation] : lungs were clear to auscultation bilaterally [Normal Rate] : heart rate was normal [Regular Rhythm] : with a regular rhythm [Spine Straight] : spine straight [Normal Gait] : normal gait [Right Foot Was Examined] : right foot ~C was examined [Left Foot Was Examined] : left foot ~C was examined [2+] : 2+ in the dorsalis pedis [Normal Reflexes] : deep tendon reflexes were 2+ and symmetric [Oriented x3] : oriented to person, place, and time [Acanthosis Nigricans] : no acanthosis nigricans [de-identified] : periorbital edema [de-identified] : R thyroid lobe palpated with increased consistency [de-identified] : 1+ pitting edema b/l [de-identified] : abdomen distended, no tenderness [de-identified] : LE hyperpigmentation (R>L)

## 2021-05-27 NOTE — ADDENDUM
[FreeTextEntry1] : I, Sandi Marshall, acted solely as a scribe for Dr. Juan Francisco Acosta on this date. 05/25/2021.

## 2021-05-27 NOTE — ASSESSMENT
[Carbohydrate Consistent Diet] : carbohydrate consistent diet [Importance of Diet and Exercise] : importance of diet and exercise to improve glycemic control, achieve weight loss and improve cardiovascular health [Hypoglycemia Management] : hypoglycemia management [Self Monitoring of Blood Glucose] : self monitoring of blood glucose [Levothyroxine] : The patient was instructed to take Levothyroxine on an empty stomach, separate from vitamins, and wait at least 30 minutes before eating [FreeTextEntry1] : 56 y/o F pt with:\par \par 1. DM (dx in ~2015), uncontrolled with Hx of autoimmune hepatitis, thrombocytopenia and obesity: \par Pt is on MDI with Basaglar and Fiasp ac meal\par Her BS readings are better. \par She needs to continue with multiple insulin injections.\par \par 2.  Hashimoto hypothyroidism (dx 20 yrs ago) \par Pt appears to be euthyroid clinically \par She is on Levothyroxine 275 mcg. She is having TFTs completed today. \par Will call pt to adjust Levothyroxine dose if necessary. \par \par Return in  4 months.

## 2021-05-27 NOTE — HISTORY OF PRESENT ILLNESS
[FreeTextEntry1] : 56 y/o F pt, with Hx of T2DM (dx in ~2015), and Hx of Hashimoto hypothyroidism (dx >20 yrs ago). 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 Multiple surgeries including b/l endoscopic sinus surgery and b/l inferior turbinate reduction for chronic sinusitis. Thrombocytopenia (platelet inj multiple times a year)\par FHx: DM (mother), Thyroid disorder (mother, sister, aunt), Osteoporosis, Heart disease. \par SHx: Former smoker (quit On disability. \par Lifestyle: Eats 2 meals a day (lunch & dinner). Checks FBS almost daily. \par LMP: ~10 yrs ago (no menses since "lining surgery"). Pt has never been pregnant. \par Last ophthalmologist visit: 2019\par Follows with hepatologist regularly. \par \par 05/05/2021 \par Pt presents today with POCT 250, POCT A1c 10.2%, /77 and BMI 39.05 for endocrine f/u. She went to [place] from 2/2021 - 4/16/21 to tend for her sick mother and attend funerals of her mother and sister. She saw her hepatologist here on 4/26/21 who informed that her liver is not doing good and she was sent for MRI. \par Pt had ascites fluid drained 5 times between 8/2020 - 12/2020. She also had TIPS liver shunt revision done.\par Pt receives platelet infusion multiple times in a year. She received one infusion while she was in [place] in early 20201. \par She checked her FBS twice today and it was reportedly 190 and 200s. She has lost 14 lbs in last 7 months. \par \par 05/25/2021 \par Pt presents today with POCT 160, /60 and BMI 37.94 for endocrine f/u. She reports feeling much better than she had previously and her eyesight has improved. Pt c/o BS is not where she would like it to be. Pt ran out of Fiasp 3 days ago and ask for prescription to be sent. \par Pt brought BS readings: \par -FBS: Average 180\par -Post prandial BS: Average 190 \par \par Current Medications: Basaglar 22u qd, Fiasp 7u ac, Ozempic qw (since ~3/2020), Repaglinide 1 mg ac, Levothyroxine 275 mcg qd (increased in ~2/2021), Alendronate 70 mg qw, Ursodiol 300x3 bid, Metoprolol 50mg qd, Spironolactone 200 mg bid, Gabapentin 300 mg tid, Furosemide 80 mg bid, Xifaxan 550 mg bid, Mycophenolate, Dexilant 60 mg qd\par \par Labs: \par - 5/5/21: A1c 10.6%,  LDL-c 131, non-HDL cholesterol 159, cholesterol 203, HDL-c 43, s.creat 0.87, Ca 9.2, Free T4 1.6, Vit D 25-OH 65.2, Free T3 1.43, TSH 12.2, Thyroglobulin <0.20, Thyroglobulin Ab 3459.0, \par - 05/05/21: POCT A1c 10.2%\par - 04/26/21: A1c 10.77%, Glucose 363, s.creat 0.84, AST 68, ALT 48, ALK PHOS 258, TSH 21.7, Free T4 1.7, Total T4 11.4, T3 59, Vit D 25-OH 60.1\par - 10/10/20: A1c 6.2%, s.creat 0.85, LDL-c 93,  TG 88, TSH 6.08, Free T4 2.0 (H)

## 2021-06-04 LAB
ALBUMIN SERPL ELPH-MCNC: 4.2 G/DL
ALP BLD-CCNC: 257 U/L
ALT SERPL-CCNC: 53 U/L
ANION GAP SERPL CALC-SCNC: 16 MMOL/L
AST SERPL-CCNC: 76 U/L
BILIRUB SERPL-MCNC: 2.9 MG/DL
BUN SERPL-MCNC: 20 MG/DL
CALCIUM SERPL-MCNC: 9.5 MG/DL
CHLORIDE SERPL-SCNC: 96 MMOL/L
CO2 SERPL-SCNC: 26 MMOL/L
CREAT SERPL-MCNC: 0.79 MG/DL
GLUCOSE BLDC GLUCOMTR-MCNC: 160
GLUCOSE SERPL-MCNC: 167 MG/DL
POTASSIUM SERPL-SCNC: 3.3 MMOL/L
PROT SERPL-MCNC: 7.1 G/DL
SODIUM SERPL-SCNC: 138 MMOL/L
T3FREE SERPL-MCNC: 1.63 PG/ML
T4 FREE SERPL-MCNC: 2.1 NG/DL
TSH SERPL-ACNC: 4.57 UIU/ML

## 2021-06-23 ENCOUNTER — APPOINTMENT (OUTPATIENT)
Dept: ENDOCRINOLOGY | Facility: CLINIC | Age: 56
End: 2021-06-23
Payer: COMMERCIAL

## 2021-06-23 VITALS
WEIGHT: 281 LBS | BODY MASS INDEX: 39.19 KG/M2 | HEART RATE: 60 BPM | DIASTOLIC BLOOD PRESSURE: 63 MMHG | SYSTOLIC BLOOD PRESSURE: 114 MMHG

## 2021-06-23 LAB — GLUCOSE BLDC GLUCOMTR-MCNC: 117

## 2021-06-23 PROCEDURE — 82962 GLUCOSE BLOOD TEST: CPT

## 2021-06-23 PROCEDURE — 99072 ADDL SUPL MATRL&STAF TM PHE: CPT

## 2021-06-23 PROCEDURE — 99215 OFFICE O/P EST HI 40 MIN: CPT | Mod: 25

## 2021-06-23 RX ORDER — INSULIN GLARGINE 100 [IU]/ML
100 INJECTION, SOLUTION SUBCUTANEOUS
Qty: 3 | Refills: 1 | Status: ACTIVE | COMMUNITY
Start: 2021-05-05 | End: 1900-01-01

## 2021-06-25 ENCOUNTER — TRANSCRIPTION ENCOUNTER (OUTPATIENT)
Age: 56
End: 2021-06-25

## 2021-06-28 LAB
ALBUMIN SERPL ELPH-MCNC: 3.6 G/DL
ALP BLD-CCNC: 238 U/L
ALT SERPL-CCNC: 38 U/L
ANION GAP SERPL CALC-SCNC: 10 MMOL/L
AST SERPL-CCNC: 64 U/L
BILIRUB SERPL-MCNC: 2.4 MG/DL
BUN SERPL-MCNC: 22 MG/DL
CALCIUM SERPL-MCNC: 9 MG/DL
CHLORIDE SERPL-SCNC: 101 MMOL/L
CO2 SERPL-SCNC: 27 MMOL/L
CREAT SERPL-MCNC: 0.9 MG/DL
ESTIMATED AVERAGE GLUCOSE: 131 MG/DL
GLUCOSE SERPL-MCNC: 111 MG/DL
HBA1C MFR BLD HPLC: 6.2 %
POTASSIUM SERPL-SCNC: 3.7 MMOL/L
PROT SERPL-MCNC: 6.4 G/DL
SODIUM SERPL-SCNC: 138 MMOL/L
T3FREE SERPL-MCNC: 1.35 PG/ML
T4 FREE SERPL-MCNC: 1.9 NG/DL
TSH SERPL-ACNC: 6.18 UIU/ML

## 2021-06-28 RX ORDER — INSULIN LISPRO 100 [IU]/ML
100 INJECTION, SOLUTION INTRAVENOUS; SUBCUTANEOUS
Qty: 2 | Refills: 2 | Status: DISCONTINUED | COMMUNITY
Start: 2021-06-04 | End: 2021-06-28

## 2021-06-28 RX ORDER — LEVOTHYROXINE SODIUM 0.07 MG/1
75 TABLET ORAL DAILY
Qty: 30 | Refills: 5 | Status: ACTIVE | COMMUNITY
Start: 2021-06-04 | End: 1900-01-01

## 2021-06-28 RX ORDER — INSULIN ASPART INJECTION 100 [IU]/ML
100 INJECTION, SOLUTION SUBCUTANEOUS
Qty: 1 | Refills: 1 | Status: DISCONTINUED | COMMUNITY
Start: 2021-05-05 | End: 2021-06-28

## 2021-06-28 RX ORDER — INSULIN LISPRO 100 [IU]/ML
100 INJECTION, SOLUTION INTRAVENOUS; SUBCUTANEOUS
Qty: 3 | Refills: 2 | Status: ACTIVE | COMMUNITY
Start: 2021-06-23 | End: 1900-01-01

## 2021-06-28 RX ORDER — LEVOTHYROXINE SODIUM 0.2 MG/1
200 TABLET ORAL DAILY
Qty: 30 | Refills: 5 | Status: ACTIVE | COMMUNITY
Start: 2018-02-09

## 2021-06-28 RX ORDER — LEVOTHYROXINE SODIUM ANHYDROUS 200 UG/5ML
200 INJECTION, POWDER, LYOPHILIZED, FOR SOLUTION INTRAVENOUS
Qty: 90 | Refills: 3 | Status: DISCONTINUED | COMMUNITY
Start: 2021-06-04 | End: 2021-06-28

## 2021-06-28 NOTE — END OF VISIT
[Time Spent: ___ minutes] : I have spent [unfilled] minutes of time on the encounter. [FreeTextEntry3] : All medical record entries made by the Scribe were at my, Dr. Juan Francisco Acosta, direction and personally dictated by me on 06/23/2021. I have reviewed the chart and agree that the record accurately reflects my personal performance of the history, physical exam, assessment and plan. I have also personally directed, reviewed and agreed with the chart.

## 2021-06-28 NOTE — ASSESSMENT
[Carbohydrate Consistent Diet] : carbohydrate consistent diet [Importance of Diet and Exercise] : importance of diet and exercise to improve glycemic control, achieve weight loss and improve cardiovascular health [Exercise/Effect on Glucose] : exercise/effect on glucose [Self Monitoring of Blood Glucose] : self monitoring of blood glucose [Weight Loss] : weight loss [Levothyroxine] : The patient was instructed to take Levothyroxine on an empty stomach, separate from vitamins, and wait at least 30 minutes before eating [FreeTextEntry1] : 55 y/o F pt with:\par \par 1. T2DM (dx in 6 yrs ago):\par A1c 10.6% in 5/2021. She has multiple complications and comorbidities including autoimmune hepatitis and hypothyroidism. \par Because of poor DM control, pt was restarted on MDI and was recommended to modify her lifestyle including structured diet. She shows clinical signs of improvement. BS readings have also improved. However, her DM remains uncontrolled. Recommend pt to see RD and she is hesitant. \par Also provided pt with new MDI: Basaglar 35 u qpm and Humalog 7 u ac +ss:\par BS <100: 0 u\par -150: +1 u\par -200: +2 u\par -250: +3 u\par -300: +4 u\par BS >300: +5 u\par Today is the 4th time that I saw pt over past 5 weeks. Explained essential and general concepts on DM management. \par Follow up with NP. Refer pt to RD. \par \par 2.  Hashimoto hypothyroidism (dx 20 yrs ago) \par Pt appears to be euthyroid clinically. Her TSH has improved from 12.2 to 4.7. Free T4 is 2.1 which is slightly increase. Free T3 is low. The conversion from T4 to T3 appears to be effective which is seen on pt with chronic illnesses and on multiple medications such as Ursodiol. Recommend to assess her thyroid hormone levels- sending TFTs today. Will consider decreasing Levothyroxine slightly and add Cytomel. \par \par Return in 4-6 months.

## 2021-06-28 NOTE — ADDENDUM
[FreeTextEntry1] : I, Quyen Hagen, acted solely as a scribe for Dr. Juan Francisco Acosta on this date. 06/23/2021. \par \par \par \par I spoke with patient this afternoon.  June 23, 2021, A1c 6.2% and TSH 6.18.\par Patient had made an excellent improvement\par Decreased MDI Basaglar to 25 units down from 35 and Humalog 5 units with each meal.\par \par \par

## 2021-06-28 NOTE — PHYSICAL EXAM
[Alert] : alert [Normal Sclera/Conjunctiva] : normal sclera/conjunctiva [Normal Outer Ear/Nose] : the ears and nose were normal in appearance [No Respiratory Distress] : no respiratory distress [Clear to Auscultation] : lungs were clear to auscultation bilaterally [Normal Rate] : heart rate was normal [Regular Rhythm] : with a regular rhythm [Spine Straight] : spine straight [Normal Gait] : normal gait [Right Foot Was Examined] : right foot ~C was examined [Left Foot Was Examined] : left foot ~C was examined [2+] : 2+ in the dorsalis pedis [Normal Reflexes] : deep tendon reflexes were 2+ and symmetric [Oriented x3] : oriented to person, place, and time [Acanthosis Nigricans] : no acanthosis nigricans [de-identified] : periorbital edema [de-identified] : R thyroid lobe palpated with increased consistency [de-identified] : 1+ pitting edema b/l [de-identified] : abdomen distended, no tenderness [de-identified] : LE hyperpigmentation (R>L)

## 2021-06-28 NOTE — CONSULT LETTER
[Dear  ___] : Dear  [unfilled], [Courtesy Letter:] : I had the pleasure of seeing your patient, [unfilled], in my office today. [Sincerely,] : Sincerely, [FreeTextEntry3] : Juan Francisco Acosta

## 2021-06-28 NOTE — HISTORY OF PRESENT ILLNESS
[FreeTextEntry1] : 57 y/o F pt, with Hx of T2DM (dx in ~2015), and Hx of Hashimoto hypothyroidism (dx >20 yrs ago). 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 Multiple surgeries including b/l endoscopic sinus surgery and b/l inferior turbinate reduction for chronic sinusitis. Thrombocytopenia (platelet inj multiple times a year)\par FHx: DM (mother), Thyroid disorder (mother, sister, aunt), Osteoporosis, Heart disease. \par SHx: Former smoker. On disability. Lives with her . \par Lifestyle: No scheduled meals; eats 2 meals a day (lunch & dinner). Cooks almost daily and rarely eats outside. Checks FBS almost daily. \par LMP: ~10 yrs ago (no menses since "lining surgery"). Pt has never been pregnant. \par Last ophthalmologist visit: 2019\par Follows with hepatologist regularly. \par \par 05/05/2021 \par Pt presents today with POCT 250, POCT A1c 10.2%, /77 and BMI 39.05 for endocrine f/u. She went to [place] from 2/2021 - 4/16/21 to tend for her sick mother and attend funerals of her mother and sister. She saw her hepatologist here on 4/26/21 who informed that her liver is not doing good and she was sent for MRI. \par Pt had ascites fluid drained 5 times between 8/2020 - 12/2020. She also had TIPS liver shunt revision done.\par Pt receives platelet infusion multiple times in a year. She received one infusion while she was in [place] in early 20201. \par She checked her FBS twice today and it was reportedly 190 and 200s. She has lost 14 lbs in last 7 months. \par \par 05/25/2021 \par Pt presents today with POCT 160, /60 and BMI 37.94 for endocrine f/u. She reports feeling much better than she had previously and her eyesight has improved. Pt c/o BS is not where she would like it to be. Pt ran out of Fiasp 3 days ago and ask for prescription to be sent. \par Pt brought BS readings: \par -FBS: Average 180\par -Post prandial BS: Average 190 \par \par 06/23/2021\par Pt presents today with POCT 117, /63 and BMI 39.19 for endocrine f/u. She is feeling well with no acute complaints. \par Pt has gained 9 lbs in last 1 month. She lives with her , cooks almost daily and rarely eats outside. Pt does not have scheduled meals and she states that she cannot bring change to her meal schedule because she has get labs drawn almost weekly. She states "I cannot explain how my system works". \par Pt brought her BS record. FBS: 156, 131, 149, 153. PPBS: 151, 161, 138. \par \par Current Medications: Basaglar 38 u qd, Humalog 7 u ac, Levothyroxine 275 mcg qd (increased in ~2/2021), Alendronate 70 mg qw, Ursodiol 300x3 bid, Metoprolol 50mg qd, Spironolactone 200 mg bid, Gabapentin 300 mg tid, Furosemide 80 mg bid, Xifaxan 550 mg bid, Mycophenolate, Dexilant 60 mg qd\par \par Labs: \par - 05/25/21: s.creat 0.79, AST 76, ALT 53, ALK PHOS 257, TSH 4.57, Free T4 2.1 (H), Free T3 1.63 (L), \par - 05/05/21: A1c 10.6%, s.creat 0.87, Cholesterol 203, LDL-c 131, TSH 12.2, Free T4 1.6, Free T3 1.43, Thyroglobulin <0.20, Thyroglobulin Ab 3459.0, Ca 9.2,  Vit D 25-OH 65.2\par - 05/05/21: POCT A1c 10.2%\par - 04/26/21: A1c 10.77%, s.creat 0.84, AST 68, ALT 48, ALK PHOS 258, TSH 21.7, Free T4 1.7, Total T4 11.4, T3 59, Vit D 25-OH 60.1\par - 10/10/20: A1c 6.2%, s.creat 0.85, LDL-c 93,  TG 88, TSH 6.08, Free T4 2.0 (H)

## 2021-08-04 ENCOUNTER — NON-APPOINTMENT (OUTPATIENT)
Age: 56
End: 2021-08-04

## 2021-08-05 ENCOUNTER — APPOINTMENT (OUTPATIENT)
Dept: ENDOCRINOLOGY | Facility: CLINIC | Age: 56
End: 2021-08-05
Payer: COMMERCIAL

## 2021-08-05 VITALS
HEART RATE: 66 BPM | DIASTOLIC BLOOD PRESSURE: 73 MMHG | BODY MASS INDEX: 38.64 KG/M2 | WEIGHT: 276 LBS | SYSTOLIC BLOOD PRESSURE: 125 MMHG | HEIGHT: 71 IN

## 2021-08-05 LAB
GLUCOSE BLDC GLUCOMTR-MCNC: 131
HBA1C MFR BLD HPLC: 5.5

## 2021-08-05 PROCEDURE — 99213 OFFICE O/P EST LOW 20 MIN: CPT | Mod: 25

## 2021-08-05 PROCEDURE — 83036 HEMOGLOBIN GLYCOSYLATED A1C: CPT | Mod: QW

## 2021-08-05 PROCEDURE — 82962 GLUCOSE BLOOD TEST: CPT

## 2021-08-05 PROCEDURE — 97802 MEDICAL NUTRITION INDIV IN: CPT

## 2021-08-05 RX ORDER — PEN NEEDLE, DIABETIC 29 G X1/2"
32G X 4 MM NEEDLE, DISPOSABLE MISCELLANEOUS
Qty: 2 | Refills: 3 | Status: ACTIVE | COMMUNITY
Start: 2021-05-05 | End: 1900-01-01

## 2021-08-05 RX ORDER — BLOOD SUGAR DIAGNOSTIC
STRIP MISCELLANEOUS
Qty: 4 | Refills: 3 | Status: ACTIVE | COMMUNITY
Start: 2021-08-05 | End: 1900-01-01

## 2021-08-05 RX ORDER — BLOOD-GLUCOSE METER
W/DEVICE EACH MISCELLANEOUS
Qty: 1 | Refills: 0 | Status: ACTIVE | COMMUNITY
Start: 2021-08-05 | End: 1900-01-01

## 2021-08-05 NOTE — ASSESSMENT
[FreeTextEntry1] : 55 y/o F pt, with Hx of T2DM (dx in ~2015), and Hx of Hashimoto hypothyroidism (dx >20 yrs ago). She was evaluated at [facility] for autoimmune disorders and was confirmed with Hashimoto hypothyroidism.\par \par A1C of 5.5% does not seem with slightly above goal FSG. Would get fructosamine with next labs.\par Continue same treatment: Basaglar 35 HS and humalog 5+KATELYN starting at 100 before meals.\par SMBG: Pt would benefit form CGM. She tests BG 4x/day and takes insulin 4x/day with frequent dose adjustments. She also has thrombocytopenia, so multiple FSG cause unnecessary bleeding and she is immunosuppressed so multiple FSG increase risk of infection.\par Demonstrated how to place seble sensor and plased sample to left upper arm.\par RD today.\par \par RTO 3 months. [Carbohydrate Consistent Diet] : carbohydrate consistent diet [Importance of Diet and Exercise] : importance of diet and exercise to improve glycemic control, achieve weight loss and improve cardiovascular health [Hypoglycemia Management] : hypoglycemia management [Self Monitoring of Blood Glucose] : self monitoring of blood glucose [Retinopathy Screening] : Patient was referred to ophthalmology for retinopathy screening

## 2021-08-05 NOTE — HISTORY OF PRESENT ILLNESS
[FreeTextEntry1] : 57 y/o F pt, with Hx of T2DM (dx in ~), and Hx of Hashimoto hypothyroidism (dx >20 yrs ago). 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 Multiple surgeries including b/l endoscopic sinus surgery and b/l inferior turbinate reduction for chronic sinusitis. Thrombocytopenia (platelet inj multiple times a year)\par FHx: DM (mother), Thyroid disorder (mother, sister, aunt), Osteoporosis, Heart disease. \par SHx: Former smoker. On disability. Lives with her . \par Lifestyle: No scheduled meals; eats 2 meals a day (lunch & dinner). Cooks almost daily and rarely eats outside. Checks FBS almost daily. \par LMP: ~10 yrs ago (no menses since "lining surgery"). Pt has never been pregnant. \par Last ophthalmologist visit: 2019\par Follows with hepatologist regularly. \par Pt of Dr Acosta.\par \par Interim:\par She is being evaluated for liver transplant. \par POC A1C 5.5%.\par Treatment: Basaglar 35 units HS and humalog 5 units + KATELYN starting at 100 before meals.\par SMBx/day. Fasting - 130 - 184, pre lunch - 136-200, pre inner 132-222. No hypoglycemia.\par RD today.

## 2021-08-05 NOTE — PHYSICAL EXAM
[Alert] : alert [Well Nourished] : well nourished [No Acute Distress] : no acute distress [Well Developed] : well developed [Normal Sclera/Conjunctiva] : normal sclera/conjunctiva [EOMI] : extra ocular movement intact [No Proptosis] : no proptosis [Normal Oropharynx] : the oropharynx was normal [Thyroid Not Enlarged] : the thyroid was not enlarged [No Thyroid Nodules] : no palpable thyroid nodules [No Respiratory Distress] : no respiratory distress [No Accessory Muscle Use] : no accessory muscle use [Clear to Auscultation] : lungs were clear to auscultation bilaterally [Normal S1, S2] : normal S1 and S2 [Normal Rate] : heart rate was normal [Regular Rhythm] : with a regular rhythm [No Edema] : no peripheral edema [Pedal Pulses Normal] : the pedal pulses are present [Normal Bowel Sounds] : normal bowel sounds [Not Tender] : non-tender [Soft] : abdomen soft [Normal Anterior Cervical Nodes] : no anterior cervical lymphadenopathy [Normal Posterior Cervical Nodes] : no posterior cervical lymphadenopathy [No Spinal Tenderness] : no spinal tenderness [Spine Straight] : spine straight [No Stigmata of Cushings Syndrome] : no stigmata of Cushings Syndrome [Normal Gait] : normal gait [Normal Strength/Tone] : muscle strength and tone were normal [No Rash] : no rash [Acanthosis Nigricans] : no acanthosis nigricans [Normal Reflexes] : deep tendon reflexes were 2+ and symmetric [No Tremors] : no tremors [Oriented x3] : oriented to person, place, and time [de-identified] : distended

## 2021-09-30 NOTE — ASU PATIENT PROFILE, ADULT - PRO ARRIVE FROM
Peng Bravo Patient Age: 79 year old  MESSAGE:   Patient is calling and requesting work in for tomorrow. Patient was seen in office today and prescribed a powder that needs to be put in his ear by a physician. Patient states that he was told that once he has the medication he needs to call us to be seen by someone tomorrow who can put this in. No current openings at the time of the call. Please advise.     WEIGHT AND HEIGHT:   Wt Readings from Last 1 Encounters:   09/30/21 90.6 kg (199 lb 12.8 oz)     Ht Readings from Last 1 Encounters:   09/15/21 5' 9\" (1.753 m)     BMI Readings from Last 1 Encounters:   09/30/21 29.51 kg/m²       ALLERGIES:  Patient has no known allergies.  Current Outpatient Medications   Medication   • DISPENSE   • ofloxacin (FLOXIN) 0.3 % otic solution   • mometasone (ELOCON) 0.1 % cream   • prednisoLONE acetate (PRED FORTE) 1 % ophthalmic suspension   • clotrimazole (LOTRIMIN) 1 % topical solution   • montelukast (SINGULAIR) 10 MG tablet   • levoFLOXacin (LEVAQUIN) 500 MG tablet   • acetaminophen-codeine (TYLENOL NO.3) 300-30 MG per tablet   • mineral oil liquid   • insulin aspart (NovoLOG PenFill) 100 UNIT/ML cartridge   • Continuous Blood Gluc  (Dexcom G6 ) Device   • Continuous Blood Gluc Sensor (Dexcom G6 Sensor) Misc   • Continuous Blood Gluc Transmit (Dexcom G6 Transmitter) Misc   • tamsulosin (FLOMAX) 0.4 MG Cap   • clotrimazole (LOTRIMIN) 1 % topical solution   • finasteride (PROSCAR) 5 MG tablet   • lisinopril (ZESTRIL) 10 MG tablet   • omeprazole (PriLOSEC) 40 MG capsule   • Accu-Chek FastClix Lancets Misc   • Accu-Chek Padmini Plus test strip   • Omega-3 Fatty Acids (FISH OIL) 1000 MG capsule   • insulin glargine (LANTUS) 100 UNIT/ML injectable solution   • methylcellulose 500 MG tablet     No current facility-administered medications for this visit.     PHARMACY to use: N/a          Pharmacy preference(s) on file:   [facility] [place] [address]  [address]  [address] [address]  Phone: [phone] Fax: [phone]    [id number] - [place], [place] - [address] AT [facility]  [address]  [facility] [address]  Phone: [phone] Fax: [phone]    [id number] - [place] - [address]  [address]  [place] [address]  Phone: [phone] Fax: [phone]    [id number] - [place], [place] - [address]  [address] [address]  Phone: [phone] Fax: [phone]    [facility] - [address]  [address]  [facility] [address]  Phone: [phone] Fax: [phone]      CALL BACK INFO: Ok to leave response (including medical information) on answering machine  ROUTING: Patient's physician/staff        PCP: Rob May MD         INS: Payor: MEDICARE / Plan: PARTA AND B / Product Type: MEDICARE   PATIENT ADDRESS:  [id number] [address] [address]   home

## 2021-10-21 ENCOUNTER — APPOINTMENT (OUTPATIENT)
Dept: ENDOCRINOLOGY | Facility: CLINIC | Age: 56
End: 2021-10-21
Payer: COMMERCIAL

## 2021-10-21 VITALS
BODY MASS INDEX: 40.87 KG/M2 | DIASTOLIC BLOOD PRESSURE: 61 MMHG | HEART RATE: 68 BPM | SYSTOLIC BLOOD PRESSURE: 111 MMHG | WEIGHT: 293 LBS

## 2021-10-21 LAB
GLUCOSE BLDC GLUCOMTR-MCNC: 121
HBA1C MFR BLD HPLC: 5.7

## 2021-10-21 PROCEDURE — 99213 OFFICE O/P EST LOW 20 MIN: CPT | Mod: 25

## 2021-10-21 PROCEDURE — 83036 HEMOGLOBIN GLYCOSYLATED A1C: CPT | Mod: QW

## 2021-10-21 PROCEDURE — 95251 CONT GLUC MNTR ANALYSIS I&R: CPT

## 2021-10-21 PROCEDURE — 82962 GLUCOSE BLOOD TEST: CPT

## 2021-10-21 NOTE — HISTORY OF PRESENT ILLNESS
[FreeTextEntry1] : 55 y/o F pt, with Hx of T2DM (dx in ~2015), and Hx of Hashimoto hypothyroidism (dx >20 yrs ago). 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 Multiple surgeries including b/l endoscopic sinus surgery and b/l inferior turbinate reduction for chronic sinusitis. Thrombocytopenia (platelet inj multiple times a year)\par FHx: DM (mother), Thyroid disorder (mother, sister, aunt), Osteoporosis, Heart disease. \par SHx: Former smoker. On disability. Lives with her . \par Lifestyle: No scheduled meals; eats 2 meals a day (lunch & dinner). Cooks almost daily and rarely eats outside. Checks FBS almost daily. \par LMP: ~10 yrs ago (no menses since "lining surgery"). Pt has never been pregnant. \par Last ophthalmologist visit: 2019\par Follows with hepatologist regularly. \par Pt of Dr Acosta.\par \par Interim:\par She is being evaluated for liver transplant. She is has increased ascites, increased WOB, early satiety and fatigue.\par POC A1C 5.7%. Likely falsely low in setting of liver disease.\par Treatment: Basaglar 25 units HS and humalog 5 units + KATELYN starting at 100 before meals. \par SMBG: She received FreeStyle seble. Data uploaded and analyzed. TIR 64%, no lows. GMI 7.4% GV 27.7%. Active CGM 62%.\par Sometimes she doesn't take humalog if BG is 100 before meals. She reports some lows requiring treatment, mostly overnight early morning, though not reflected in seble report for past 2 weeks.\par Basal/bolus doses are disproportionate. \par She eats inconsistent carbs with meals.\par

## 2021-10-21 NOTE — ASSESSMENT
[Carbohydrate Consistent Diet] : carbohydrate consistent diet [Importance of Diet and Exercise] : importance of diet and exercise to improve glycemic control, achieve weight loss and improve cardiovascular health [Hypoglycemia Management] : hypoglycemia management [Self Monitoring of Blood Glucose] : self monitoring of blood glucose [FreeTextEntry1] : 55 y/o F pt, with Hx of T2DM (dx in ~2015), and Hx of Hashimoto hypothyroidism (dx >20 yrs ago). She was evaluated at [facility] for autoimmune disorders and was confirmed with Hashimoto hypothyroidism.\par \par A1C of 5.5% does not seem with slightly above goal FSG. Aubrey GMI 7.4%.\par Decrease Basaglar to 20 units HS and humalog 8+KATELYN starting at 80 before meals. Explained that holding insulin premeal for target BG will reuslt in PP hyperglycemia. If low before meal, treat with 15gm carb and monitor for increase. If increased and plan to eat, take premeal insulin as usual.\par SMBG: Continue freestyle aubrey. Ensure that meter and testing supplies are available if sensor malfunctions.\par Diet: Discussed nutrition label. Explained that carbs amount has to balance insulin dose. Provided carb count quick reference guide. Goal of 30gm CHO with each meal. If she doesn't eat or no starch at meal, no premeal insulin.\par \par TFTs and fructosamine/c-peptide at [place].\par \par RTO 2  months.

## 2021-10-21 NOTE — PHYSICAL EXAM
[Alert] : alert [Well Nourished] : well nourished [No Acute Distress] : no acute distress [Well Developed] : well developed [Normal Sclera/Conjunctiva] : normal sclera/conjunctiva [EOMI] : extra ocular movement intact [No Proptosis] : no proptosis [Normal Oropharynx] : the oropharynx was normal [Thyroid Not Enlarged] : the thyroid was not enlarged [No Thyroid Nodules] : no palpable thyroid nodules [No Respiratory Distress] : no respiratory distress [No Accessory Muscle Use] : no accessory muscle use [Clear to Auscultation] : lungs were clear to auscultation bilaterally [Normal S1, S2] : normal S1 and S2 [Normal Rate] : heart rate was normal [Regular Rhythm] : with a regular rhythm [No Edema] : no peripheral edema [Pedal Pulses Normal] : the pedal pulses are present [Normal Bowel Sounds] : normal bowel sounds [Not Tender] : non-tender [Soft] : abdomen soft [Normal Anterior Cervical Nodes] : no anterior cervical lymphadenopathy [No Spinal Tenderness] : no spinal tenderness [Spine Straight] : spine straight [No Stigmata of Cushings Syndrome] : no stigmata of Cushings Syndrome [Normal Gait] : normal gait [Normal Strength/Tone] : muscle strength and tone were normal [No Rash] : no rash [Normal Reflexes] : deep tendon reflexes were 2+ and symmetric [No Tremors] : no tremors [Oriented x3] : oriented to person, place, and time [Acanthosis Nigricans] : no acanthosis nigricans [de-identified] : distended

## 2021-12-13 ENCOUNTER — APPOINTMENT (OUTPATIENT)
Dept: ENDOCRINOLOGY | Facility: CLINIC | Age: 56
End: 2021-12-13

## 2022-05-12 ENCOUNTER — APPOINTMENT (OUTPATIENT)
Dept: ENDOCRINOLOGY | Facility: CLINIC | Age: 57
End: 2022-05-12
Payer: COMMERCIAL

## 2022-05-12 VITALS
HEIGHT: 71 IN | DIASTOLIC BLOOD PRESSURE: 60 MMHG | SYSTOLIC BLOOD PRESSURE: 126 MMHG | WEIGHT: 260 LBS | HEART RATE: 71 BPM | BODY MASS INDEX: 36.4 KG/M2

## 2022-05-12 DIAGNOSIS — E11.9 TYPE 2 DIABETES MELLITUS W/OUT COMPLICATIONS: ICD-10-CM

## 2022-05-12 DIAGNOSIS — E06.3 OTHER SPECIFIED HYPOTHYROIDISM: ICD-10-CM

## 2022-05-12 DIAGNOSIS — E03.8 OTHER SPECIFIED HYPOTHYROIDISM: ICD-10-CM

## 2022-05-12 PROCEDURE — 99214 OFFICE O/P EST MOD 30 MIN: CPT | Mod: 25

## 2022-05-12 PROCEDURE — 82962 GLUCOSE BLOOD TEST: CPT

## 2022-05-12 RX ORDER — FLASH GLUCOSE SENSOR
KIT MISCELLANEOUS
Qty: 6 | Refills: 3 | Status: ACTIVE | COMMUNITY
Start: 2021-08-05 | End: 1900-01-01

## 2022-05-30 LAB
25(OH)D3 SERPL-MCNC: 42.1 NG/ML
ALBUMIN SERPL ELPH-MCNC: 3.5 G/DL
ALP BLD-CCNC: 278 U/L
ALT SERPL-CCNC: 28 U/L
ANION GAP SERPL CALC-SCNC: 12 MMOL/L
AST SERPL-CCNC: 50 U/L
BILIRUB SERPL-MCNC: 1.9 MG/DL
BUN SERPL-MCNC: 22 MG/DL
CALCIUM SERPL-MCNC: 9 MG/DL
CHLORIDE SERPL-SCNC: 100 MMOL/L
CHOLEST SERPL-MCNC: 169 MG/DL
CO2 SERPL-SCNC: 26 MMOL/L
CREAT SERPL-MCNC: 0.81 MG/DL
CREAT SPEC-SCNC: 91 MG/DL
EGFR: 85 ML/MIN/1.73M2
ESTIMATED AVERAGE GLUCOSE: 143 MG/DL
GLUCOSE BLDC GLUCOMTR-MCNC: 272
GLUCOSE SERPL-MCNC: 298 MG/DL
HBA1C MFR BLD HPLC: 6.3
HBA1C MFR BLD HPLC: 6.6 %
HDLC SERPL-MCNC: 51 MG/DL
LDLC SERPL CALC-MCNC: 99 MG/DL
MICROALBUMIN 24H UR DL<=1MG/L-MCNC: <1.2 MG/DL
MICROALBUMIN/CREAT 24H UR-RTO: NORMAL MG/G
NONHDLC SERPL-MCNC: 118 MG/DL
POTASSIUM SERPL-SCNC: 3.6 MMOL/L
PROT SERPL-MCNC: 6.8 G/DL
SODIUM SERPL-SCNC: 138 MMOL/L
T4 FREE SERPL-MCNC: 2.1 NG/DL
TRIGL SERPL-MCNC: 93 MG/DL
TSH SERPL-ACNC: 0.62 UIU/ML

## 2022-06-15 ENCOUNTER — NON-APPOINTMENT (OUTPATIENT)
Age: 57
End: 2022-06-15

## 2022-07-06 NOTE — ADDENDUM
[FreeTextEntry1] : IBryan Son act soley as a scribe for Dr. Juan Francisco Acosta on this date. 05/12/2022 \par \par 7/6/2022 AL\par Pt injects insulin 4/xday. She currently uses CGM, which is necessary for frequent insulin dose adjustments. She has a history of hypoglycemia. She has adherence to CGM regimen an diabetes treatment plan.

## 2022-07-06 NOTE — END OF VISIT
[Time Spent: ___ minutes] : I have spent [unfilled] minutes of time on the encounter. [FreeTextEntry3] : All medical record entries made by the Scribe were at my, Dr. Juan Francisco Acosta, direction and personally dictated by me on 05/12/2022. I have reviewed the chart and agree that the record accurately reflects my personal performance of the history, physical exam, assessment and plan. I have also personally directed, reviewed and agreed with the chart.

## 2022-07-06 NOTE — HISTORY OF PRESENT ILLNESS
[FreeTextEntry1] : 57 y/o F pt, with Hx of T2DM (dx in ~2015), and Hx of Hashimoto hypothyroidism (dx >20 yrs ago). 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 Multiple surgeries including b/l endoscopic sinus surgery and b/l inferior turbinate reduction for chronic sinusitis. Thrombocytopenia (platelet inj multiple times a year)\par PSHx: Abd surgery for Hernia (2022)\par FHx: DM (mother), Thyroid disorder (mother, sister, aunt), Osteoporosis, Heart disease. \par SHx: Former smoker. On disability. Lives with her . \par Lifestyle: No scheduled meals; eats 2 meals a day (lunch & dinner). Cooks almost daily and rarely eats outside. Checks FBS almost daily. \par LMP: ~10 yrs ago (no menses since "lining surgery"). Pt has never been pregnant. \par Last ophthalmologist visit: 2019\par Follows with hepatologist regularly. \par \par 05/12/2022\par Pt had 2 major surgeries in the abdomen for hernia in the past 4 months (currently on drainage) and saw Dr. Kulkarni couple days ago. She has not seen ophthalmologist yet. \par Today, pt notes that she has been feeling better within the past 2 weeks, with POCT A1c 6.3%, POCT 272, /60 and BMI 36.26. She lost 33 lbs in 7 months (lost >30 lbs after surgery). She is on a regular diet, and avoids carbohydrates". \par 2 weeks ago, pt experienced hypoglycemias (she was not able to measure BS because she "ran out of sticks last week"). \par Pt is on Basaglar 30 u qd and Humalog 7 u ac +ss for DM and Levothyroxine 275 mcg for thyroid. \par \par Current Medications: Basaglar 20 u qd (decreased this visit 05/12/22), Humalog 7 u ac, Levothyroxine 275 mcg qd (increased in ~2/2021), Alendronate 70 mg qw, Ursodiol 300x3 bid, Metoprolol 50 mg qd, Spironolactone 200 mg bid, Gabapentin 300 mg tid, Furosemide 80 mg bid, Xifaxan 550 mg bid, Mycophenolate, Dexilant 60 mg qd

## 2022-07-06 NOTE — DATA REVIEWED
[FreeTextEntry1] : Labs: \par - 05/12/22: POCT A1c 6.3%\par - 10/21/21: POCT A1c 5.7% (H)\par - 08/05/21: POCT A1c 5.5%\par - 06/23/21: A1c 6.2% (H), Free T4 1.9 (H), Free T3 1.35 (L), TSH 6.18 (H)\par - 05/25/21: s.creat 0.79, AST 76, ALT 53, ALK PHOS 257, TSH 4.57, Free T4 2.1 (H), Free T3 1.63 (L), \par - 05/05/21: A1c 10.6%, s.creat 0.87, Cholesterol 203, LDL-c 131, TSH 12.2, Free T4 1.6, Free T3 1.43, Thyroglobulin <0.20, Thyroglobulin Ab 3459.0, Ca 9.2,  Vit D 25-OH 65.2\par - 05/05/21: POCT A1c 10.2%\par - 04/26/21: A1c 10.77%, s.creat 0.84, AST 68, ALT 48, ALK PHOS 258, TSH 21.7, Free T4 1.7, Total T4 11.4, T3 59, Vit D 25-OH 60.1\par - 10/10/20: A1c 6.2%, s.creat 0.85, LDL-c 93,  TG 88, TSH 6.08, Free T4 2.0 (H)

## 2022-07-06 NOTE — REVIEW OF SYSTEMS
[Recent Weight Loss (___ Lbs)] : recent weight loss: [unfilled] lbs [As Noted in HPI] : as noted in HPI [Negative] : Heme/Lymph [de-identified] : Hypoglycemias.

## 2022-07-06 NOTE — ASSESSMENT
[Levothyroxine] : The patient was instructed to take Levothyroxine on an empty stomach, separate from vitamins, and wait at least 30 minutes before eating [FreeTextEntry1] : 55 y/o F pt with:\par \par 1. T2DM diagnosed 7-8 yrs ago:\par Pt also has history of autoimmune hepatitis and hypothyroidism. \par She recently underwent Abd hernia surgery, currently on drainage. \par During surgery, she lost ~ 20 lbs. \par Her pre and post prandial glucose are in target. \par Pt still experiences hypoglycemias and her POCT A1c is 6.3%. \par Therefore, decrease Basaglar from 35 to 20 u and continue Humalog 7 u ac +ss. \par Of note, we are considering discontinuing MDI and starting oral diabetic medications instead. \par Send labs today. Will contact pt with results within the next week.\par \par 2.  History of hypothyroidism (dx 20 yrs ago):\par Pt appears to be euthyroid clinically. \par Continue Levothyroxine 275 mcg. \par Blood test today. \par \par Return in 3 months.

## 2022-07-06 NOTE — THERAPY
[Today's Date] : [unfilled] [Basaglar] : Basaglar [Humalog] : Humalog [FreeTextEntry9] : 20 u  [de-identified] : 7 u ac +ss:\par BS <100: 0 u\par -150: +1 u\par -200: +2 u

## 2022-07-06 NOTE — PHYSICAL EXAM
[Alert] : alert [Normal Sclera/Conjunctiva] : normal sclera/conjunctiva [Normal Outer Ear/Nose] : the ears and nose were normal in appearance [No Respiratory Distress] : no respiratory distress [Clear to Auscultation] : lungs were clear to auscultation bilaterally [Normal Rate] : heart rate was normal [Regular Rhythm] : with a regular rhythm [Spine Straight] : spine straight [Normal Gait] : normal gait [2+] : 2+ in the dorsalis pedis [Normal Reflexes] : deep tendon reflexes were 2+ and symmetric [Oriented x3] : oriented to person, place, and time [Acanthosis Nigricans] : no acanthosis nigricans [de-identified] : periorbital edema [de-identified] : R thyroid lobe palpated with increased consistency [de-identified] : 2+ pitting edema b/l [de-identified] : abdomen distended, no tenderness [de-identified] : LE hyperpigmentation (R>L)

## 2022-10-11 ENCOUNTER — APPOINTMENT (OUTPATIENT)
Dept: ORTHOPEDIC SURGERY | Facility: CLINIC | Age: 57
End: 2022-10-11

## 2022-10-11 VITALS — RESPIRATION RATE: 11 BRPM | BODY MASS INDEX: 36.4 KG/M2 | HEIGHT: 71 IN | WEIGHT: 260 LBS

## 2022-10-11 PROCEDURE — 99204 OFFICE O/P NEW MOD 45 MIN: CPT

## 2022-10-25 ENCOUNTER — APPOINTMENT (OUTPATIENT)
Dept: ORTHOPEDIC SURGERY | Facility: CLINIC | Age: 57
End: 2022-10-25

## 2022-10-25 VITALS — WEIGHT: 260 LBS | RESPIRATION RATE: 16 BRPM | BODY MASS INDEX: 36.4 KG/M2 | HEIGHT: 71 IN

## 2022-10-25 PROCEDURE — 99212 OFFICE O/P EST SF 10 MIN: CPT

## 2022-10-25 RX ORDER — SPIRONOLACTONE 25 MG/1
25 TABLET ORAL
Qty: 30 | Refills: 0 | Status: ACTIVE | COMMUNITY
Start: 2022-09-26

## 2022-10-25 RX ORDER — SPIRONOLACTONE 50 MG/1
50 TABLET ORAL
Qty: 90 | Refills: 0 | Status: ACTIVE | COMMUNITY
Start: 2022-09-08

## 2022-11-17 ENCOUNTER — APPOINTMENT (OUTPATIENT)
Dept: ORTHOPEDIC SURGERY | Facility: CLINIC | Age: 57
End: 2022-11-17

## 2022-11-17 VITALS — BODY MASS INDEX: 36.4 KG/M2 | HEIGHT: 71 IN | RESPIRATION RATE: 16 BRPM | WEIGHT: 260 LBS

## 2022-11-17 PROCEDURE — 73030 X-RAY EXAM OF SHOULDER: CPT | Mod: LT

## 2022-11-17 PROCEDURE — 99212 OFFICE O/P EST SF 10 MIN: CPT

## 2022-11-17 RX ORDER — NALOXONE HYDROCHLORIDE 4 MG/.1ML
4 SPRAY NASAL
Qty: 2 | Refills: 0 | Status: ACTIVE | COMMUNITY
Start: 2022-09-30

## 2022-11-17 RX ORDER — OXYCODONE 5 MG/1
5 TABLET ORAL
Qty: 30 | Refills: 0 | Status: ACTIVE | COMMUNITY
Start: 2022-09-26

## 2022-11-17 RX ORDER — BUMETANIDE 2 MG/1
2 TABLET ORAL
Qty: 360 | Refills: 0 | Status: ACTIVE | COMMUNITY
Start: 2022-04-07

## 2022-11-17 RX ORDER — ERGOCALCIFEROL 1.25 MG/1
1.25 MG CAPSULE, LIQUID FILLED ORAL
Qty: 4 | Refills: 0 | Status: ACTIVE | COMMUNITY
Start: 2022-09-08

## 2022-11-17 RX ORDER — BUMETANIDE 1 MG/1
1 TABLET ORAL
Qty: 30 | Refills: 0 | Status: ACTIVE | COMMUNITY
Start: 2022-09-08

## 2022-11-17 RX ORDER — BENZOCAINE 20 G/100G
100 GEL, DENTIFRICE ORAL
Qty: 10 | Refills: 0 | Status: ACTIVE | COMMUNITY
Start: 2022-09-26

## 2022-11-17 RX ORDER — TRAMADOL HYDROCHLORIDE 50 MG/1
50 TABLET, COATED ORAL
Qty: 30 | Refills: 0 | Status: ACTIVE | COMMUNITY
Start: 2022-09-30

## 2022-11-17 RX ORDER — HYDROXYZINE HYDROCHLORIDE 25 MG/1
25 TABLET ORAL
Qty: 180 | Refills: 0 | Status: ACTIVE | COMMUNITY
Start: 2022-06-13

## 2022-11-17 RX ORDER — FLUCONAZOLE 150 MG/1
150 TABLET ORAL
Qty: 1 | Refills: 0 | Status: ACTIVE | COMMUNITY
Start: 2022-09-30

## 2022-12-15 ENCOUNTER — APPOINTMENT (OUTPATIENT)
Dept: ORTHOPEDIC SURGERY | Facility: CLINIC | Age: 57
End: 2022-12-15

## 2023-01-19 ENCOUNTER — APPOINTMENT (OUTPATIENT)
Dept: ORTHOPEDIC SURGERY | Facility: CLINIC | Age: 58
End: 2023-01-19
Payer: COMMERCIAL

## 2023-01-19 VITALS — HEIGHT: 71 IN | BODY MASS INDEX: 36.4 KG/M2 | WEIGHT: 260 LBS

## 2023-01-19 PROCEDURE — 99212 OFFICE O/P EST SF 10 MIN: CPT

## 2023-01-19 PROCEDURE — 73030 X-RAY EXAM OF SHOULDER: CPT | Mod: LT

## 2023-03-02 ENCOUNTER — APPOINTMENT (OUTPATIENT)
Dept: ORTHOPEDIC SURGERY | Facility: CLINIC | Age: 58
End: 2023-03-02

## 2023-05-09 ENCOUNTER — APPOINTMENT (OUTPATIENT)
Dept: ORTHOPEDIC SURGERY | Facility: CLINIC | Age: 58
End: 2023-05-09
Payer: COMMERCIAL

## 2023-05-09 PROCEDURE — 99212 OFFICE O/P EST SF 10 MIN: CPT

## 2023-05-09 PROCEDURE — 73030 X-RAY EXAM OF SHOULDER: CPT | Mod: LT

## 2023-08-10 ENCOUNTER — APPOINTMENT (OUTPATIENT)
Dept: ORTHOPEDIC SURGERY | Facility: CLINIC | Age: 58
End: 2023-08-10
Payer: COMMERCIAL

## 2023-08-10 VITALS — HEIGHT: 71 IN | BODY MASS INDEX: 37.8 KG/M2 | WEIGHT: 270 LBS

## 2023-08-10 PROCEDURE — 99212 OFFICE O/P EST SF 10 MIN: CPT

## 2023-08-10 PROCEDURE — 73030 X-RAY EXAM OF SHOULDER: CPT | Mod: LT

## 2023-08-11 ENCOUNTER — APPOINTMENT (OUTPATIENT)
Dept: ORTHOPEDIC SURGERY | Facility: CLINIC | Age: 58
End: 2023-08-11
Payer: COMMERCIAL

## 2023-08-11 VITALS — WEIGHT: 270 LBS | RESPIRATION RATE: 16 BRPM | BODY MASS INDEX: 37.8 KG/M2 | HEIGHT: 71 IN

## 2023-08-11 DIAGNOSIS — M79.646 PAIN IN UNSPECIFIED FINGER(S): ICD-10-CM

## 2023-08-11 PROCEDURE — 20604 DRAIN/INJ JOINT/BURSA W/US: CPT | Mod: RT

## 2023-08-11 PROCEDURE — 99213 OFFICE O/P EST LOW 20 MIN: CPT | Mod: 25

## 2023-08-11 NOTE — HISTORY OF PRESENT ILLNESS
[Right] : right hand dominant [FreeTextEntry1] : 58-year-old RHD female presenting to the office for evaluation of right long finger DIP pain and mild swelling, that she has noticed for 5 months.  No redness or drainage.  Patient has a history of diabetes and hypothyroidism due to Hashimoto's.  She also has a history of autoimmune hepatitis for which she underwent a liver transplant.

## 2023-08-11 NOTE — CONSULT LETTER
[Dear  ___] : Dear  [unfilled], [Consult Letter:] : I had the pleasure of evaluating your patient, [unfilled]. [Consult Closing:] : Thank you very much for allowing me to participate in the care of this patient.  If you have any questions, please do not hesitate to contact me. [Sincerely,] : Sincerely, [FreeTextEntry3] : Tanner Looney MD Orthopaedic Hand / Upper Extremity Surgeon

## 2023-08-11 NOTE — ASSESSMENT
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  Treatment options were discussed for this.  I explained that this is not something that is going to go away and symptoms may flareup or worsen over time.  Shared decision making was made to proceed with a corticosteroid injection into the DIP joint for symptomatic relief.  I explained that recurrence of symptoms is not uncommon.  She will plan to follow back up with me on an as-needed basis.  I did explain to her to follow back up with me sooner if any increased pain, redness or swelling develop.  She was in accordance with the plan.

## 2023-08-11 NOTE — PROCEDURE
[FreeTextEntry1] : My impression is that the patient has right long finger DIP joint erosive arthritis. We discussed treatment options and she elected to undergo a right long finger DIP joint injection. The risks, benefits, and alternatives were discussed with the patient. This included, but was not limited to nerve injury, infection, subcutaneous atrophy, skin depigmentation etc. Under informed consent and sterile conditions the basal joint was injected with a combination of 1/2 cc Kenalog-10 and 1/2 cc of 2% plain lidocaine;. a total of 0.5 cc of the mixture was administered. It is my hopes that this significantly alleviates the patients symptoms.

## 2023-08-11 NOTE — PHYSICAL EXAM
[de-identified] : Physical exam demonstrates the patient to be alert and oriented x 3 and capable of ambulation. The patient is well-developed and well-nourished in no apparent respiratory distress. The majority of the skin is intact bilaterally in the upper extremities without any bilateral elbow lymphadenopathy.  The patient's right long finger does not exhibit any swelling or redness at the DIP joint.  It is minimally tender to palpation but is painful with active and passive range of motion of the joint.  FDP and terminal extensor tendon are intact.  Sensation is intact to light touch.  All fingers are well-perfused. [de-identified] : PA, oblique and lateral x-rays of the right long finger were obtained today to assess for bony injury.  Erosive, degenerative changes noted at the DIP joint.  No appreciation of acute fracture or dislocation.

## 2023-09-15 ENCOUNTER — APPOINTMENT (OUTPATIENT)
Dept: PULMONOLOGY | Facility: CLINIC | Age: 58
End: 2023-09-15
Payer: COMMERCIAL

## 2023-09-15 VITALS
TEMPERATURE: 98.5 F | OXYGEN SATURATION: 99 % | WEIGHT: 277 LBS | DIASTOLIC BLOOD PRESSURE: 73 MMHG | BODY MASS INDEX: 38.78 KG/M2 | HEART RATE: 92 BPM | SYSTOLIC BLOOD PRESSURE: 117 MMHG | HEIGHT: 71 IN

## 2023-09-15 DIAGNOSIS — J90 PLEURAL EFFUSION, NOT ELSEWHERE CLASSIFIED: ICD-10-CM

## 2023-09-15 DIAGNOSIS — R06.83 SNORING: ICD-10-CM

## 2023-09-15 DIAGNOSIS — R59.0 LOCALIZED ENLARGED LYMPH NODES: ICD-10-CM

## 2023-09-15 DIAGNOSIS — J44.9 CHRONIC OBSTRUCTIVE PULMONARY DISEASE, UNSPECIFIED: ICD-10-CM

## 2023-09-15 DIAGNOSIS — R91.8 OTHER NONSPECIFIC ABNORMAL FINDING OF LUNG FIELD: ICD-10-CM

## 2023-09-15 PROCEDURE — 94727 GAS DIL/WSHOT DETER LNG VOL: CPT

## 2023-09-15 PROCEDURE — 94729 DIFFUSING CAPACITY: CPT

## 2023-09-15 PROCEDURE — ZZZZZ: CPT

## 2023-09-15 PROCEDURE — 99204 OFFICE O/P NEW MOD 45 MIN: CPT | Mod: 25

## 2023-09-15 PROCEDURE — 94060 EVALUATION OF WHEEZING: CPT

## 2023-09-26 RX ORDER — TIOTROPIUM BROMIDE AND OLODATEROL 3.124; 2.736 UG/1; UG/1
2.5-2.5 SPRAY, METERED RESPIRATORY (INHALATION) DAILY
Qty: 1 | Refills: 5 | Status: ACTIVE | COMMUNITY
Start: 2023-09-26 | End: 1900-01-01

## 2023-10-17 ENCOUNTER — APPOINTMENT (OUTPATIENT)
Dept: ORTHOPEDIC SURGERY | Facility: CLINIC | Age: 58
End: 2023-10-17
Payer: COMMERCIAL

## 2023-10-17 VITALS — HEIGHT: 71 IN | BODY MASS INDEX: 38.78 KG/M2 | WEIGHT: 277 LBS

## 2023-10-17 PROCEDURE — 99212 OFFICE O/P EST SF 10 MIN: CPT

## 2023-10-17 PROCEDURE — 73030 X-RAY EXAM OF SHOULDER: CPT | Mod: LT

## 2024-01-17 ENCOUNTER — APPOINTMENT (OUTPATIENT)
Dept: OPHTHALMOLOGY | Facility: CLINIC | Age: 59
End: 2024-01-17
Payer: COMMERCIAL

## 2024-01-17 ENCOUNTER — NON-APPOINTMENT (OUTPATIENT)
Age: 59
End: 2024-01-17

## 2024-01-17 PROCEDURE — 92004 COMPRE OPH EXAM NEW PT 1/>: CPT

## 2024-01-17 PROCEDURE — 92136 OPHTHALMIC BIOMETRY: CPT

## 2024-01-17 PROCEDURE — 92250 FUNDUS PHOTOGRAPHY W/I&R: CPT

## 2024-01-18 ENCOUNTER — APPOINTMENT (OUTPATIENT)
Dept: ORTHOPEDIC SURGERY | Facility: CLINIC | Age: 59
End: 2024-01-18
Payer: COMMERCIAL

## 2024-01-18 VITALS — HEIGHT: 71 IN | BODY MASS INDEX: 31.78 KG/M2 | WEIGHT: 227 LBS

## 2024-01-18 PROCEDURE — 20610 DRAIN/INJ JOINT/BURSA W/O US: CPT | Mod: LT

## 2024-01-18 PROCEDURE — 99212 OFFICE O/P EST SF 10 MIN: CPT | Mod: 25

## 2024-03-01 ENCOUNTER — APPOINTMENT (OUTPATIENT)
Dept: ORTHOPEDIC SURGERY | Facility: CLINIC | Age: 59
End: 2024-03-01
Payer: COMMERCIAL

## 2024-03-01 PROCEDURE — 99212 OFFICE O/P EST SF 10 MIN: CPT

## 2024-06-04 ENCOUNTER — APPOINTMENT (OUTPATIENT)
Dept: ORTHOPEDIC SURGERY | Facility: CLINIC | Age: 59
End: 2024-06-04
Payer: COMMERCIAL

## 2024-06-04 VITALS — BODY MASS INDEX: 41.02 KG/M2 | WEIGHT: 293 LBS | HEIGHT: 71 IN

## 2024-06-04 DIAGNOSIS — S42.202A UNSPECIFIED FRACTURE OF UPPER END OF LEFT HUMERUS, INITIAL ENCOUNTER FOR CLOSED FRACTURE: ICD-10-CM

## 2024-06-04 DIAGNOSIS — S16.1XXA STRAIN OF MUSCLE, FASCIA AND TENDON AT NECK LEVEL, INITIAL ENCOUNTER: ICD-10-CM

## 2024-06-04 PROCEDURE — 99212 OFFICE O/P EST SF 10 MIN: CPT

## 2024-06-04 RX ORDER — TIRZEPATIDE 7.5 MG/.5ML
7.5 INJECTION, SOLUTION SUBCUTANEOUS
Refills: 0 | Status: ACTIVE | COMMUNITY

## 2024-06-05 ENCOUNTER — APPOINTMENT (OUTPATIENT)
Dept: ORTHOPEDIC SURGERY | Facility: CLINIC | Age: 59
End: 2024-06-05
Payer: COMMERCIAL

## 2024-06-05 VITALS
BODY MASS INDEX: 41.02 KG/M2 | DIASTOLIC BLOOD PRESSURE: 78 MMHG | HEART RATE: 95 BPM | HEIGHT: 71 IN | WEIGHT: 293 LBS | SYSTOLIC BLOOD PRESSURE: 130 MMHG | OXYGEN SATURATION: 97 %

## 2024-06-05 DIAGNOSIS — Z78.9 OTHER SPECIFIED HEALTH STATUS: ICD-10-CM

## 2024-06-05 PROCEDURE — 99214 OFFICE O/P EST MOD 30 MIN: CPT

## 2024-06-07 NOTE — CONSULT LETTER
[Dear  ___] : Dear  [unfilled], [Consult Letter:] : I had the pleasure of evaluating your patient, [unfilled]. [Please see my note below.] : Please see my note below. [Consult Closing:] : Thank you very much for allowing me to participate in the care of this patient.  If you have any questions, please do not hesitate to contact me. [Sincerely,] : Sincerely, [FreeTextEntry3] : Cary Mcclelland MD

## 2024-06-07 NOTE — ADDENDUM
[FreeTextEntry1] : Documented by Nancy Maynard acting as a scribe for Dr. Cary Mcclelland. 06/05/2024.

## 2024-06-07 NOTE — DISCUSSION/SUMMARY

## 2024-06-07 NOTE — PHYSICAL EXAM
[de-identified] : General: Well-nourished, well-developed, alert, and in no acute distress. Head: Normocephalic. Eyes: Pupils equal, extraocular muscles intact, normal sclera. Nose: No nasal discharge. Cardiovascular: Extremities are warm and well perfused. Distal pulses are symmetric bilaterally. Respiratory: No labored breathing. Extremities: Sensation is intact distally bilaterally. Distal pulses are symmetric bilaterally Lymphatic: No regional lymphadenopathy, no lymphedema Neurologic: No focal deficits Skin: Normal skin color, texture, and turgor Psychiatric: Normal affect  MSK: Examination of [right] knee:   Gait [slightly antalgic] Alignment: genu valgum Effusion: none b/l lower extremity edema and hyperpigmentation.  Nontender to palpation: medial joint line, lateral joint line, medial patellar facet, lateral patellar facet, quad tendon, patellar tendon, pes, Gerdy's tubercle, tibial tuberosity, popliteal fossa, hamstrings, ITB No warmth No Baker's cyst palpable ROM: 0-[90] [No] patellar crepitus   Log roll negative Lachman negative Anterior drawer negative Posterior drawer negative Varus/valgus stress negative at 0 and 30 deg Johnathon negative Patellar grind negative Extensor mechanism intact   Examination of [left] knee: No effusion, hematoma Nontender to palpation: medial joint line, lateral joint line, medial patellar facet, lateral patellar facet, quad tendon, patellar tendon, pes, Gerdy's tubercle, tibial tuberosity, popliteal fossa, hamstrings, ITB Abrasion over the left shin. Old horizontal scar on the left knee, well-healed. No warmth No Baker's cyst palpable ROM: 0-120 [No] patellar crepitus   Log roll negative Lachman negative Anterior drawer negative Posterior drawer negative Varus/valgus stress negative at 0 and 30 deg Johnathon negative Patellar grind negative Extensor mechanism intact  Examination of [right] foot and ankle Unable to toe walk, heel walk Inspection: No erythema, hematoma, ecchymosis or edema No calluses/corns/blisters/warts/paronychia or subungual hematoma No warmth   Nontender to palpation: medial malleolus, lateral malleolus, base of 5th metatarsal, navicular, ATFL, CFL, Achilles tendon, posterior tibialis, FHL, tibialis anterior, peroneals, plantar fascia   ROM: Plantar flexion 35 deg, dorsiflexion 0 deg, inversion 20 deg, eversion 20 deg   Special Tests:   Casey's click [negative] Mild Hallux valgus deformity No Valgus hindfoot deformity No Shereen's deformity Anterior Drawer (ATFL): negative for pain and laxity Talar Tilt (CFL): negative for pain and laxity Kleigers (ext rot): negative for pain and laxity at deltoid ligament or distal fibula Squeeze test: negative at proximal and distal syndesmosis Bump test: negative at talar dome, syndesmosis Tinel's at tarsal tunnel negative Orr test negative   Examination of [left] foot and ankle   Inspection: No erythema, hematoma, ecchymosis or edema No calluses/corns/blisters/warts/paronychia or subungual hematoma No warmth  Nontender to palpation: medial malleolus, lateral malleolus, base of 5th metatarsal, navicular, ATFL, CFL, PTFL, AITFL, Achilles tendon, PT, FHL, tibialis anterior, peroneals, plantar fascia   ROM: Plantar flexion 45 deg, dorsiflexion 20 deg, inversion 20 deg, eversion 20 deg   Special Tests:   Casey's click negative Mild Hallux valgus deformity No Valgus hindfoot deformity No Shereen's deformity Anterior Drawer (ATFL): negative for pain and laxity Talar Tilt (CFL): negative for pain and laxity Kleigers (ext rot): negative for pain and laxity at deltoid ligament or distal fibula Squeeze test: negative at proximal and distal syndesmosis Bump test: negative at talar dome, syndesmosis Tinel's at tarsal tunnel negative Orr test negative   Sensation is intact to light touch over the superficial and deep peroneal nerve distributions and the posterior tibial nerve distribution. Capillary refill is less than two seconds. Posterior tibial and dorsalis pedis pulses 2+ equal bilaterally. No calf swelling or tenderness bilaterally. Strength testing shows hip flexion 5/5, hip adduction 5/5, hip abduction 5/5, knee extension 5/5, knee flexion 5/5, dorsiflexion 5/5, plantar flexion 5/5, EHL 5/5 Reflexes: Patellar 2+, Achilles 2+     [de-identified] : X-ray Right Knee [[facility], 06-03-24]: Reviewed by me: No evidence of fracture, dislocation, or joint malalignment. No evidence of joint space narrowing. X-ray Right Ankle [[facility], 06-03-24]: Reviewed by me: Diffuse prominence of ankle soft tissues. No displaced fracture. Joint spaces are preserved. Normal ankle morphology.

## 2024-06-07 NOTE — HISTORY OF PRESENT ILLNESS
[de-identified] : LISE VALADEZ is a 58 year old female, presents with right knee pain that began two to three days ago. She reports that the pain started after spending three hours in a car, which she believes may have contributed to the issue. The pain is localized to the right knee and was exacerbated during a physical therapy session in [place], where she resides. The physical therapist performed a massage that caused significant pain, despite the patient having a high pain tolerance. The patient has a history of a liver transplant performed last year and is diabetic. She has also undergone multiple surgeries for multiple bowel obstructions. She has been managing the pain with ice, which provides temporary relief, but is cautious about taking Tylenol due to her liver condition. She has not taken any other medications for pain. The patient has a history of a shoulder injury and has been under the care of Dr. Kirill Rivera for two years. She has had two recent physical therapy sessions for her foot, which she believes is more painful than her knee at the moment.

## 2024-06-07 NOTE — ASSESSMENT
[FreeTextEntry1] : LISE VALADEZ is a 58 year old female with right foot and ankle pain. I discussed with the patient that their symptoms, signs, and imaging are most consistent with tenosynovitis. We reviewed the natural history of this condition and treatment options. We agreed on the following plan:  XR reviewed with patient today. Start Home Exercises for foot and ankle conditioning. Demonstration, handout provided. Referral for a short cam boot provided. Continue with ice and elevation. Medication: Acetaminophen or ibuprofen occasionally, patient to confirm with hepatologist given history of liver transplant. Advanced imaging: consider XR right foot, MRI if no symptomatic improvement Follow up in 2 weeks.

## 2024-06-07 NOTE — END OF VISIT
[FreeTextEntry3] : All medical record entries made by the Nancy Maynard were at my, Dr.Leslie Mcclelland, direction and personally dictated by me on 06/05/2024. I have reviewed the chart and agree that the record accurately reflects my personal performance of the history, physical exam, assessment, and plan. I have also personally directed, reviewed, and agreed with the chart. [Time Spent: ___ minutes] : I have spent [unfilled] minutes of time on the encounter.

## 2024-06-26 ENCOUNTER — APPOINTMENT (OUTPATIENT)
Dept: ORTHOPEDIC SURGERY | Facility: CLINIC | Age: 59
End: 2024-06-26
Payer: COMMERCIAL

## 2024-06-26 ENCOUNTER — NON-APPOINTMENT (OUTPATIENT)
Age: 59
End: 2024-06-26

## 2024-06-26 VITALS — OXYGEN SATURATION: 98 % | HEART RATE: 94 BPM | SYSTOLIC BLOOD PRESSURE: 137 MMHG | DIASTOLIC BLOOD PRESSURE: 84 MMHG

## 2024-06-26 DIAGNOSIS — M65.9 SYNOVITIS AND TENOSYNOVITIS, UNSPECIFIED: ICD-10-CM

## 2024-06-26 PROCEDURE — 99213 OFFICE O/P EST LOW 20 MIN: CPT

## 2024-06-28 PROBLEM — M65.9 TENOSYNOVITIS OF FOOT: Status: ACTIVE | Noted: 2024-06-05

## 2024-07-29 ENCOUNTER — APPOINTMENT (OUTPATIENT)
Dept: ORTHOPEDIC SURGERY | Facility: CLINIC | Age: 59
End: 2024-07-29

## 2024-07-31 PROBLEM — E06.3 HYPOTHYROIDISM DUE TO HASHIMOTO'S THYROIDITIS: Status: ACTIVE | Noted: 2020-10-15

## 2024-08-01 ENCOUNTER — APPOINTMENT (OUTPATIENT)
Dept: ORTHOPEDIC SURGERY | Facility: CLINIC | Age: 59
End: 2024-08-01
Payer: COMMERCIAL

## 2024-08-01 ENCOUNTER — OUTPATIENT (OUTPATIENT)
Dept: OUTPATIENT SERVICES | Facility: HOSPITAL | Age: 59
LOS: 1 days | End: 2024-08-01
Payer: COMMERCIAL

## 2024-08-01 ENCOUNTER — RESULT REVIEW (OUTPATIENT)
Age: 59
End: 2024-08-01

## 2024-08-01 VITALS — DIASTOLIC BLOOD PRESSURE: 62 MMHG | OXYGEN SATURATION: 98 % | HEART RATE: 77 BPM | SYSTOLIC BLOOD PRESSURE: 122 MMHG

## 2024-08-01 DIAGNOSIS — M65.9 SYNOVITIS AND TENOSYNOVITIS, UNSPECIFIED: ICD-10-CM

## 2024-08-01 DIAGNOSIS — Z95.828 PRESENCE OF OTHER VASCULAR IMPLANTS AND GRAFTS: Chronic | ICD-10-CM

## 2024-08-01 DIAGNOSIS — Z98.89 OTHER SPECIFIED POSTPROCEDURAL STATES: Chronic | ICD-10-CM

## 2024-08-01 DIAGNOSIS — Z98.1 ARTHRODESIS STATUS: Chronic | ICD-10-CM

## 2024-08-01 DIAGNOSIS — I86.8 VARICOSE VEINS OF OTHER SPECIFIED SITES: Chronic | ICD-10-CM

## 2024-08-01 PROCEDURE — 73630 X-RAY EXAM OF FOOT: CPT | Mod: 26,RT

## 2024-08-01 PROCEDURE — 99213 OFFICE O/P EST LOW 20 MIN: CPT

## 2024-08-01 PROCEDURE — 73630 X-RAY EXAM OF FOOT: CPT

## 2024-08-06 NOTE — ASSESSMENT
[FreeTextEntry1] : LISE VALADEZ is a 59 year old female with right foot pain  I discussed with the patient that their symptoms, signs, and imaging are most consistent with peroneal tendinitis and generalized foot tenosynovitis.  We reviewed the natural history of this condition and treatment options. We agreed on the following plan:  Can use short cam boot for long walks. Encouraged to start home exercises for perineal tendon conditioning per handout. Start physical therapy. Referral provided Recommend 150 min per week of moderate intensity aerobic activity  Imaging: Consider MRI if no symptomatic improvements.  Follow up in 6 weeks.

## 2024-08-06 NOTE — PHYSICAL EXAM
[de-identified] : General: Well-nourished, well-developed, alert, and in no acute distress. Head: Normocephalic. Eyes: Pupils equal, extraocular muscles intact, normal sclera. Nose: No nasal discharge. Cardiovascular: Extremities are warm and well perfused. Distal pulses are symmetric bilaterally. Respiratory: No labored breathing. Extremities: Sensation is intact distally bilaterally. Distal pulses are symmetric bilaterally Lymphatic: No regional lymphadenopathy, no lymphedema Neurologic: No focal deficits Skin: Normal skin color, texture, and turgor Psychiatric: Normal affect  MSK: Examination of [right] foot and ankle Gait [normal] Able to toe walk, heel walk Ambulating independently Inspection: No erythema, hematoma, ecchymosis or 1+edema No calluses/corns/blisters/warts/paronychia or subungual hematoma No warmth   Tender to palpation: peroneal tendons Nontender to palpation: medial malleolus, lateral malleolus, base of 5th metatarsal, navicular, ATFL, CFL, Achilles tendon, posterior tibialis, FHL, tibialis anterior, plantar fascia   ROM: Plantar flexion 35 deg, dorsiflexion 0 deg, inversion 20 deg, eversion 20 deg   Special Tests:   Casey's click [negative] Mild Hallux valgus deformity No Valgus hindfoot deformity No Shereen's deformity Anterior Drawer (ATFL): negative for pain and laxity Talar Tilt (CFL): negative for pain and laxity Kleigers (ext rot): negative for pain and laxity at deltoid ligament or distal fibula Squeeze test: negative at proximal and distal syndesmosis Bump test: negative at talar dome, syndesmosis Tinel's at tarsal tunnel negative Orr test negative  Sensation is intact to light touch over the superficial and deep peroneal nerve distributions and the posterior tibial nerve distribution. Capillary refill is less than two seconds. Posterior tibial and dorsalis pedis pulses 2+ equal bilaterally. No calf swelling or tenderness bilaterally. Strength testing shows  Hip flexion 5/5, Hip abduction 5/5, Hip abduction 5/5, Knee Extension 5/5, Knee Flexion 5/5, dorsiflexion 5/5, plantar flexion 5/5, EHL 5/5 Reflexes: Patellar 2+, Achilles 2+. [de-identified] : Date: 08/01/2024 Location: [facility] Body part: Right Foot Impression: No evidence of fracture or dislocation. Joint spaces are preserved.

## 2024-08-06 NOTE — HISTORY OF PRESENT ILLNESS
[de-identified] : LISE VALADEZ is a 59-year-old female presents to follow up for right foot.  Last visit was 06/26/2024. Pt. states she is still experiencing dull pain. Pt. states she has been using the Cam boot. Pt. would like new PT new referral.

## 2024-08-06 NOTE — END OF VISIT
[FreeTextEntry3] : All medical record entries made by the Jaimeibkyle were at my, Dr. Cary Mcclelland, direction and personally dictated by me on 08/01/2024. I have reviewed the chart and agree that the record accurately reflects my personal performance of the history, physical exam, assessment and plan. I have also personally directed, reviewed, and agreed with the chart.

## 2024-08-06 NOTE — ADDENDUM
[FreeTextEntry1] : Documented by Joyce Metzger acting a as a scribe for Dr. Cary Mcclelland. 08/01/2024

## 2024-08-06 NOTE — ASSESSMENT
[FreeTextEntry1] : LSIE VALADEZ is a 59 year old female with right foot pain  I discussed with the patient that their symptoms, signs, and imaging are most consistent with peroneal tendinitis and generalized foot tenosynovitis.  We reviewed the natural history of this condition and treatment options. We agreed on the following plan:  Can use short cam boot for long walks. Encouraged to start home exercises for perineal tendon conditioning per handout. Start physical therapy. Referral provided Recommend 150 min per week of moderate intensity aerobic activity  Imaging: Consider MRI if no symptomatic improvements.  Follow up in 6 weeks.

## 2024-08-06 NOTE — HISTORY OF PRESENT ILLNESS
[de-identified] : LISE VALADEZ is a 59-year-old female presents to follow up for right foot.  Last visit was 06/26/2024. Pt. states she is still experiencing dull pain. Pt. states she has been using the Cam boot. Pt. would like new PT new referral.

## 2024-08-06 NOTE — PHYSICAL EXAM
[de-identified] : General: Well-nourished, well-developed, alert, and in no acute distress. Head: Normocephalic. Eyes: Pupils equal, extraocular muscles intact, normal sclera. Nose: No nasal discharge. Cardiovascular: Extremities are warm and well perfused. Distal pulses are symmetric bilaterally. Respiratory: No labored breathing. Extremities: Sensation is intact distally bilaterally. Distal pulses are symmetric bilaterally Lymphatic: No regional lymphadenopathy, no lymphedema Neurologic: No focal deficits Skin: Normal skin color, texture, and turgor Psychiatric: Normal affect  MSK: Examination of [right] foot and ankle Gait [normal] Able to toe walk, heel walk Ambulating independently Inspection: No erythema, hematoma, ecchymosis or 1+edema No calluses/corns/blisters/warts/paronychia or subungual hematoma No warmth   Tender to palpation: peroneal tendons Nontender to palpation: medial malleolus, lateral malleolus, base of 5th metatarsal, navicular, ATFL, CFL, Achilles tendon, posterior tibialis, FHL, tibialis anterior, plantar fascia   ROM: Plantar flexion 35 deg, dorsiflexion 0 deg, inversion 20 deg, eversion 20 deg   Special Tests:   Casey's click [negative] Mild Hallux valgus deformity No Valgus hindfoot deformity No Shereen's deformity Anterior Drawer (ATFL): negative for pain and laxity Talar Tilt (CFL): negative for pain and laxity Kleigers (ext rot): negative for pain and laxity at deltoid ligament or distal fibula Squeeze test: negative at proximal and distal syndesmosis Bump test: negative at talar dome, syndesmosis Tinel's at tarsal tunnel negative Orr test negative  Sensation is intact to light touch over the superficial and deep peroneal nerve distributions and the posterior tibial nerve distribution. Capillary refill is less than two seconds. Posterior tibial and dorsalis pedis pulses 2+ equal bilaterally. No calf swelling or tenderness bilaterally. Strength testing shows  Hip flexion 5/5, Hip abduction 5/5, Hip abduction 5/5, Knee Extension 5/5, Knee Flexion 5/5, dorsiflexion 5/5, plantar flexion 5/5, EHL 5/5 Reflexes: Patellar 2+, Achilles 2+. [de-identified] : Date: 08/01/2024 Location: [facility] Body part: Right Foot Impression: No evidence of fracture or dislocation. Joint spaces are preserved.

## 2024-10-03 ENCOUNTER — APPOINTMENT (OUTPATIENT)
Dept: ORTHOPEDIC SURGERY | Facility: CLINIC | Age: 59
End: 2024-10-03
Payer: COMMERCIAL

## 2024-10-03 VITALS
BODY MASS INDEX: 41.02 KG/M2 | HEART RATE: 91 BPM | OXYGEN SATURATION: 96 % | DIASTOLIC BLOOD PRESSURE: 80 MMHG | SYSTOLIC BLOOD PRESSURE: 136 MMHG | HEIGHT: 71 IN | WEIGHT: 293 LBS

## 2024-10-03 DIAGNOSIS — M21.42 FLAT FOOT [PES PLANUS] (ACQUIRED), RIGHT FOOT: ICD-10-CM

## 2024-10-03 DIAGNOSIS — M65.979 UNSPECIFIED SYNOVITIS AND TENOSYNOVITIS, UNSPECIFIED ANK/FT: ICD-10-CM

## 2024-10-03 DIAGNOSIS — M20.42 OTHER HAMMER TOE(S) (ACQUIRED), RIGHT FOOT: ICD-10-CM

## 2024-10-03 DIAGNOSIS — M21.41 FLAT FOOT [PES PLANUS] (ACQUIRED), RIGHT FOOT: ICD-10-CM

## 2024-10-03 DIAGNOSIS — M20.41 OTHER HAMMER TOE(S) (ACQUIRED), RIGHT FOOT: ICD-10-CM

## 2024-10-03 PROCEDURE — 99214 OFFICE O/P EST MOD 30 MIN: CPT

## 2024-10-05 PROBLEM — M21.41 PES PLANUS OF BOTH FEET: Status: ACTIVE | Noted: 2024-10-05

## 2024-10-05 PROBLEM — M65.979 PERONEAL TENOSYNOVITIS: Status: ACTIVE | Noted: 2024-08-01

## 2024-10-05 PROBLEM — M20.41 ACQUIRED BILATERAL HAMMER TOES: Status: ACTIVE | Noted: 2024-10-05

## 2024-10-05 PROBLEM — M65.979 TENOSYNOVITIS OF FOOT: Status: ACTIVE | Noted: 2024-06-05

## 2024-10-05 NOTE — HISTORY OF PRESENT ILLNESS
[de-identified] : LISE VALADEZ is a 59-year-old female who presents for a follow-up of ankle pain. The patient reports experiencing pain in the lateral aspect of the ankle. The pain improves when resting at home and worsens with prolonged activity. Last visit was on 08/01/2024. The patient is currently attending therapy but is considering changing therapists due to dissatisfaction with the current treatment. The patient is wearing extra wide, orthopedic shoes to accommodate swelling, which occurs frequently. The patient uses topical creams for pain relief and occasionally takes Advil, although there is concern about the long-term use of pain medications. The patient engages in activities such as crafts and cooking to distract from the pain.

## 2024-10-05 NOTE — ASSESSMENT
[FreeTextEntry1] : LISE VALADEZ is a 59 year old female with right foot and ankle pain.   I discussed with the patient that their symptoms, signs, and imaging are most consistent with peroneal tendinosis, pes  planus, hammertoes, crossover deformity, and arthritis.  We reviewed the natural history of this condition and treatment options. We agreed on the following plan:  Reviewed XR Encouraged to continue home exercises per handout. Supportive wide-toe box shoes advised. Referral to Podiatry for orthotics evaluation. Dr. Patel's contact information provided. Follow up as needed.

## 2024-10-05 NOTE — ADDENDUM
[FreeTextEntry1] : I, Doris Kelly ([facility]) assisted in filling out this chart under the dictation of Cary Mcclelland on 10/03/2024 .

## 2024-10-05 NOTE — PHYSICAL EXAM
[de-identified] : General: Well-nourished, well-developed, alert, and in no acute distress. Head: Normocephalic. Eyes: Pupils equal, extraocular muscles intact, normal sclera. Nose: No nasal discharge. Cardiovascular: Extremities are warm and well perfused. Distal pulses are symmetric bilaterally. Respiratory: No labored breathing. Extremities: Sensation is intact distally bilaterally. Distal pulses are symmetric bilaterally Lymphatic: No regional lymphadenopathy, no lymphedema Neurologic: No focal deficits Skin: Normal skin color, texture, and turgor Psychiatric: Normal affect  MSK: Examination of [right] foot and ankle Gait [normal] Ambulating independently Inspection: 1+edema, calluses, splayed 3rd toe, hammer toe, 5th hammer toe No erythema, hematoma, ecchymosis No warmth  Tender to palpation: peroneal tendons Nontender to palpation: 1st MTP, medial malleolus, lateral malleolus, base of 5th metatarsal, navicular, ATFL, CFL, Achilles tendon, posterior tibialis, FHL, tibialis anterior, plantar fascia  ROM: Plantar flexion 35 deg, dorsiflexion 0 deg, inversion 10 deg, eversion 10 deg  Special Tests:  Casey's click [negative] Mild Hallux valgus deformity No Valgus hindfoot deformity No Shereen's deformity Anterior Drawer (ATFL): negative for pain and laxity Talar Tilt (CFL): negative for pain and laxity Kleigers (ext rot): negative for pain and laxity at deltoid ligament or distal fibula Squeeze test: negative at proximal and distal syndesmosis Bump test: negative at talar dome, syndesmosis Tinel's at tarsal tunnel negative Orr test negative  Sensation is intact to light touch over the superficial and deep peroneal nerve distributions and the posterior tibial nerve distribution. Capillary refill is less than two seconds. Posterior tibial and dorsalis pedis pulses 2+ equal bilaterally. No calf swelling or tenderness bilaterally. Strength testing shows Hip flexion 5/5, Hip abduction 5/5, Hip abduction 5/5, Knee Extension 5/5, Knee Flexion 5/5, dorsiflexion 5/5, plantar flexion 5/5, EHL 5/5 Reflexes: Patellar 2+, Achilles 2+.

## 2024-10-05 NOTE — ADDENDUM
[FreeTextEntry1] : I, Doris Kelly ([facility]) assisted in filling out this chart under the dictation of Cary Mcclelland on 10/03/2024 .

## 2024-10-05 NOTE — DISCUSSION/SUMMARY

## 2024-10-05 NOTE — DISCUSSION/SUMMARY

## 2024-10-05 NOTE — END OF VISIT
[Time Spent: ___ minutes] : I have spent [unfilled] minutes of time on the encounter which excludes teaching and separately reported services. [FreeTextEntry3] : Documented by Doris Kelly acting as a scribe for Cary Mcclelland on 10/03/2024   All medical record entries made by the Scribe were at my, Dr. Cary Mcclelland direction and personally dictated by me on 10/03/2024 . I have reviewed the chart and agree that the record accurately reflects my personal performance of the history, physical exam, assessment and plan. I have also personally directed, reviewed, and agreed with the chart.

## 2024-10-05 NOTE — HISTORY OF PRESENT ILLNESS
[de-identified] : LISE VALADEZ is a 59-year-old female who presents for a follow-up of ankle pain. The patient reports experiencing pain in the lateral aspect of the ankle. The pain improves when resting at home and worsens with prolonged activity. Last visit was on 08/01/2024. The patient is currently attending therapy but is considering changing therapists due to dissatisfaction with the current treatment. The patient is wearing extra wide, orthopedic shoes to accommodate swelling, which occurs frequently. The patient uses topical creams for pain relief and occasionally takes Advil, although there is concern about the long-term use of pain medications. The patient engages in activities such as crafts and cooking to distract from the pain.

## 2024-10-05 NOTE — HISTORY OF PRESENT ILLNESS
[de-identified] : LISE VALADEZ is a 59-year-old female who presents for a follow-up of ankle pain. The patient reports experiencing pain in the lateral aspect of the ankle. The pain improves when resting at home and worsens with prolonged activity. Last visit was on 08/01/2024. The patient is currently attending therapy but is considering changing therapists due to dissatisfaction with the current treatment. The patient is wearing extra wide, orthopedic shoes to accommodate swelling, which occurs frequently. The patient uses topical creams for pain relief and occasionally takes Advil, although there is concern about the long-term use of pain medications. The patient engages in activities such as crafts and cooking to distract from the pain.

## 2024-10-05 NOTE — ADDENDUM
[FreeTextEntry1] : I, Doris Kelly ([facility]) assisted in filling out this chart under the dictation of Cary Mcclelland on 10/03/2024 .

## 2024-10-05 NOTE — PHYSICAL EXAM
[de-identified] : General: Well-nourished, well-developed, alert, and in no acute distress. Head: Normocephalic. Eyes: Pupils equal, extraocular muscles intact, normal sclera. Nose: No nasal discharge. Cardiovascular: Extremities are warm and well perfused. Distal pulses are symmetric bilaterally. Respiratory: No labored breathing. Extremities: Sensation is intact distally bilaterally. Distal pulses are symmetric bilaterally Lymphatic: No regional lymphadenopathy, no lymphedema Neurologic: No focal deficits Skin: Normal skin color, texture, and turgor Psychiatric: Normal affect  MSK: Examination of [right] foot and ankle Gait [normal] Ambulating independently Inspection: 1+edema, calluses, splayed 3rd toe, hammer toe, 5th hammer toe No erythema, hematoma, ecchymosis No warmth  Tender to palpation: peroneal tendons Nontender to palpation: 1st MTP, medial malleolus, lateral malleolus, base of 5th metatarsal, navicular, ATFL, CFL, Achilles tendon, posterior tibialis, FHL, tibialis anterior, plantar fascia  ROM: Plantar flexion 35 deg, dorsiflexion 0 deg, inversion 10 deg, eversion 10 deg  Special Tests:  Casey's click [negative] Mild Hallux valgus deformity No Valgus hindfoot deformity No Shereen's deformity Anterior Drawer (ATFL): negative for pain and laxity Talar Tilt (CFL): negative for pain and laxity Kleigers (ext rot): negative for pain and laxity at deltoid ligament or distal fibula Squeeze test: negative at proximal and distal syndesmosis Bump test: negative at talar dome, syndesmosis Tinel's at tarsal tunnel negative Orr test negative  Sensation is intact to light touch over the superficial and deep peroneal nerve distributions and the posterior tibial nerve distribution. Capillary refill is less than two seconds. Posterior tibial and dorsalis pedis pulses 2+ equal bilaterally. No calf swelling or tenderness bilaterally. Strength testing shows Hip flexion 5/5, Hip abduction 5/5, Hip abduction 5/5, Knee Extension 5/5, Knee Flexion 5/5, dorsiflexion 5/5, plantar flexion 5/5, EHL 5/5 Reflexes: Patellar 2+, Achilles 2+. (3) no apparent problem

## 2024-10-05 NOTE — DISCUSSION/SUMMARY

## 2024-10-05 NOTE — PHYSICAL EXAM
[de-identified] : General: Well-nourished, well-developed, alert, and in no acute distress. Head: Normocephalic. Eyes: Pupils equal, extraocular muscles intact, normal sclera. Nose: No nasal discharge. Cardiovascular: Extremities are warm and well perfused. Distal pulses are symmetric bilaterally. Respiratory: No labored breathing. Extremities: Sensation is intact distally bilaterally. Distal pulses are symmetric bilaterally Lymphatic: No regional lymphadenopathy, no lymphedema Neurologic: No focal deficits Skin: Normal skin color, texture, and turgor Psychiatric: Normal affect  MSK: Examination of [right] foot and ankle Gait [normal] Ambulating independently Inspection: 1+edema, calluses, splayed 3rd toe, hammer toe, 5th hammer toe No erythema, hematoma, ecchymosis No warmth  Tender to palpation: peroneal tendons Nontender to palpation: 1st MTP, medial malleolus, lateral malleolus, base of 5th metatarsal, navicular, ATFL, CFL, Achilles tendon, posterior tibialis, FHL, tibialis anterior, plantar fascia  ROM: Plantar flexion 35 deg, dorsiflexion 0 deg, inversion 10 deg, eversion 10 deg  Special Tests:  Casey's click [negative] Mild Hallux valgus deformity No Valgus hindfoot deformity No Shereen's deformity Anterior Drawer (ATFL): negative for pain and laxity Talar Tilt (CFL): negative for pain and laxity Kleigers (ext rot): negative for pain and laxity at deltoid ligament or distal fibula Squeeze test: negative at proximal and distal syndesmosis Bump test: negative at talar dome, syndesmosis Tinel's at tarsal tunnel negative Orr test negative  Sensation is intact to light touch over the superficial and deep peroneal nerve distributions and the posterior tibial nerve distribution. Capillary refill is less than two seconds. Posterior tibial and dorsalis pedis pulses 2+ equal bilaterally. No calf swelling or tenderness bilaterally. Strength testing shows Hip flexion 5/5, Hip abduction 5/5, Hip abduction 5/5, Knee Extension 5/5, Knee Flexion 5/5, dorsiflexion 5/5, plantar flexion 5/5, EHL 5/5 Reflexes: Patellar 2+, Achilles 2+.

## 2024-12-17 ENCOUNTER — APPOINTMENT (OUTPATIENT)
Dept: ORTHOPEDIC SURGERY | Age: 59
End: 2024-12-17

## 2024-12-19 ENCOUNTER — APPOINTMENT (OUTPATIENT)
Dept: ORTHOPEDIC SURGERY | Facility: CLINIC | Age: 59
End: 2024-12-19
Payer: COMMERCIAL

## 2024-12-19 VITALS — HEART RATE: 88 BPM | OXYGEN SATURATION: 98 % | DIASTOLIC BLOOD PRESSURE: 89 MMHG | SYSTOLIC BLOOD PRESSURE: 138 MMHG
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
## 2024-12-19 DIAGNOSIS — S49.92XA UNSPECIFIED INJURY OF LEFT SHOULDER AND UPPER ARM, INITIAL ENCOUNTER: ICD-10-CM

## 2024-12-19 DIAGNOSIS — S69.92XA UNSPECIFIED INJURY OF LEFT WRIST, HAND AND FINGER(S), INITIAL ENCOUNTER: ICD-10-CM

## 2024-12-19 PROCEDURE — 99214 OFFICE O/P EST MOD 30 MIN: CPT

## 2024-12-19 RX ORDER — MELOXICAM 15 MG/1
15 TABLET ORAL DAILY
Qty: 30 | Refills: 1 | Status: ACTIVE | COMMUNITY
Start: 2024-12-19 | End: 1900-01-01

## 2024-12-19 RX ORDER — DICLOFENAC SODIUM 10 MG/G
1 GEL TOPICAL
Qty: 1 | Refills: 0 | Status: ACTIVE | COMMUNITY
Start: 2024-12-19 | End: 1900-01-01

## 2025-01-09 ENCOUNTER — APPOINTMENT (OUTPATIENT)
Dept: ORTHOPEDIC SURGERY | Facility: CLINIC | Age: 60
End: 2025-01-09
Payer: COMMERCIAL

## 2025-01-09 DIAGNOSIS — S69.82XS OTHER SPECIFIED INJURIES OF LEFT WRIST, HAND AND FINGER(S), SEQUELA: ICD-10-CM

## 2025-01-09 DIAGNOSIS — M67.912 UNSPECIFIED DISORDER OF SYNOVIUM AND TENDON, LEFT SHOULDER: ICD-10-CM

## 2025-01-09 PROCEDURE — 99213 OFFICE O/P EST LOW 20 MIN: CPT

## 2025-02-12 ENCOUNTER — RX RENEWAL (OUTPATIENT)
Age: 60
End: 2025-02-12